# Patient Record
Sex: FEMALE | Race: WHITE | Employment: FULL TIME | URBAN - METROPOLITAN AREA
[De-identification: names, ages, dates, MRNs, and addresses within clinical notes are randomized per-mention and may not be internally consistent; named-entity substitution may affect disease eponyms.]

---

## 2017-05-05 ENCOUNTER — OFFICE VISIT (OUTPATIENT)
Dept: FAMILY MEDICINE CLINIC | Age: 62
End: 2017-05-05
Payer: COMMERCIAL

## 2017-05-05 VITALS
HEART RATE: 88 BPM | DIASTOLIC BLOOD PRESSURE: 66 MMHG | BODY MASS INDEX: 45.96 KG/M2 | WEIGHT: 228 LBS | HEIGHT: 59 IN | TEMPERATURE: 98.4 F | SYSTOLIC BLOOD PRESSURE: 128 MMHG

## 2017-05-05 DIAGNOSIS — K62.5 RECTAL BLEEDING: Primary | ICD-10-CM

## 2017-05-05 DIAGNOSIS — E66.01 MORBID OBESITY WITH BMI OF 45.0-49.9, ADULT (HCC): ICD-10-CM

## 2017-05-05 PROCEDURE — 99213 OFFICE O/P EST LOW 20 MIN: CPT | Performed by: FAMILY MEDICINE

## 2017-05-05 RX ORDER — VITAMIN A ACETATE, .BETA.-CAROTENE, ASCORBIC ACID, CHOLECALCIFEROL, .ALPHA.-TOCOPHEROL ACETATE, DL-, THIAMINE MONONITRATE, RIBOFLAVIN, NIACINAMIDE, PYRIDOXINE HYDROCHLORIDE, FOLIC ACID, CYANOCOBALAMIN, CALCIUM CARBONATE, FERROUS FUMARATE, ZINC OXIDE, AND CUPRIC OXIDE 2000; 2000; 120; 400; 22; 1.84; 3; 20; 10; 1; 12; 200; 27; 25; 2 [IU]/1; [IU]/1; MG/1; [IU]/1; MG/1; MG/1; MG/1; MG/1; MG/1; MG/1; UG/1; MG/1; MG/1; MG/1; MG/1
TABLET ORAL
Refills: 11 | COMMUNITY
Start: 2017-04-06

## 2017-05-05 ASSESSMENT — PATIENT HEALTH QUESTIONNAIRE - PHQ9
SUM OF ALL RESPONSES TO PHQ QUESTIONS 1-9: 0
SUM OF ALL RESPONSES TO PHQ9 QUESTIONS 1 & 2: 0
2. FEELING DOWN, DEPRESSED OR HOPELESS: 0
1. LITTLE INTEREST OR PLEASURE IN DOING THINGS: 0

## 2017-05-05 ASSESSMENT — ENCOUNTER SYMPTOMS
CONSTIPATION: 0
SORE THROAT: 0
ABDOMINAL PAIN: 0
BLOOD IN STOOL: 1
NAUSEA: 0
SHORTNESS OF BREATH: 0

## 2017-05-18 ENCOUNTER — HOSPITAL ENCOUNTER (OUTPATIENT)
Dept: PREADMISSION TESTING | Age: 62
Discharge: HOME | End: 2017-05-18
Payer: COMMERCIAL

## 2017-05-18 VITALS
HEIGHT: 59 IN | WEIGHT: 227 LBS | BODY MASS INDEX: 45.76 KG/M2 | HEART RATE: 69 BPM | DIASTOLIC BLOOD PRESSURE: 87 MMHG | OXYGEN SATURATION: 98 % | SYSTOLIC BLOOD PRESSURE: 147 MMHG | RESPIRATION RATE: 16 BRPM | TEMPERATURE: 97.9 F

## 2017-05-18 LAB
ABSOLUTE EOS #: 0.3 K/UL (ref 0–0.4)
ABSOLUTE LYMPH #: 2.7 K/UL (ref 1–4.8)
ABSOLUTE MONO #: 0.5 K/UL (ref 0.1–1.3)
ANION GAP SERPL CALCULATED.3IONS-SCNC: 16 MMOL/L (ref 9–17)
BASOPHILS # BLD: 1 %
BASOPHILS ABSOLUTE: 0.1 K/UL (ref 0–0.2)
BUN BLDV-MCNC: 11 MG/DL (ref 8–23)
BUN/CREAT BLD: NORMAL (ref 9–20)
CALCIUM SERPL-MCNC: 9.8 MG/DL (ref 8.6–10.4)
CHLORIDE BLD-SCNC: 101 MMOL/L (ref 98–107)
CO2: 25 MMOL/L (ref 20–31)
CREAT SERPL-MCNC: 0.57 MG/DL (ref 0.5–0.9)
DIFFERENTIAL TYPE: ABNORMAL
EOSINOPHILS RELATIVE PERCENT: 3 %
GFR AFRICAN AMERICAN: >60 ML/MIN
GFR NON-AFRICAN AMERICAN: >60 ML/MIN
GFR SERPL CREATININE-BSD FRML MDRD: NORMAL ML/MIN/{1.73_M2}
GFR SERPL CREATININE-BSD FRML MDRD: NORMAL ML/MIN/{1.73_M2}
GLUCOSE BLD-MCNC: 79 MG/DL (ref 70–99)
HCT VFR BLD CALC: 41.5 % (ref 36–46)
HEMOGLOBIN: 13.3 G/DL (ref 12–16)
LYMPHOCYTES # BLD: 35 %
MCH RBC QN AUTO: 28.2 PG (ref 26–34)
MCHC RBC AUTO-ENTMCNC: 32.2 G/DL (ref 31–37)
MCV RBC AUTO: 87.6 FL (ref 80–100)
MONOCYTES # BLD: 7 %
PDW BLD-RTO: 16.2 % (ref 11.5–14.9)
PLATELET # BLD: 258 K/UL (ref 150–450)
PLATELET ESTIMATE: ABNORMAL
PMV BLD AUTO: 8.8 FL (ref 6–12)
POTASSIUM SERPL-SCNC: 3.7 MMOL/L (ref 3.7–5.3)
RBC # BLD: 4.73 M/UL (ref 4–5.2)
RBC # BLD: ABNORMAL 10*6/UL
SEG NEUTROPHILS: 54 %
SEGMENTED NEUTROPHILS ABSOLUTE COUNT: 4.1 K/UL (ref 1.3–9.1)
SODIUM BLD-SCNC: 142 MMOL/L (ref 135–144)
WBC # BLD: 7.7 K/UL (ref 3.5–11)
WBC # BLD: ABNORMAL 10*3/UL

## 2017-05-18 PROCEDURE — 85025 COMPLETE CBC W/AUTO DIFF WBC: CPT

## 2017-05-18 PROCEDURE — 36415 COLL VENOUS BLD VENIPUNCTURE: CPT

## 2017-05-18 PROCEDURE — 93005 ELECTROCARDIOGRAM TRACING: CPT

## 2017-05-18 PROCEDURE — 80048 BASIC METABOLIC PNL TOTAL CA: CPT

## 2017-05-19 LAB
EKG ATRIAL RATE: 57 BPM
EKG P AXIS: 53 DEGREES
EKG P-R INTERVAL: 152 MS
EKG Q-T INTERVAL: 424 MS
EKG QRS DURATION: 112 MS
EKG QTC CALCULATION (BAZETT): 412 MS
EKG R AXIS: -32 DEGREES
EKG T AXIS: -20 DEGREES
EKG VENTRICULAR RATE: 57 BPM

## 2017-05-23 ENCOUNTER — ANESTHESIA EVENT (OUTPATIENT)
Dept: OPERATING ROOM | Age: 62
End: 2017-05-23
Payer: COMMERCIAL

## 2017-05-24 ENCOUNTER — ANESTHESIA (OUTPATIENT)
Dept: OPERATING ROOM | Age: 62
End: 2017-05-24
Payer: COMMERCIAL

## 2017-05-24 ENCOUNTER — HOSPITAL ENCOUNTER (OUTPATIENT)
Age: 62
Setting detail: OUTPATIENT SURGERY
Discharge: HOME | End: 2017-05-24
Attending: SURGERY | Admitting: SURGERY
Payer: COMMERCIAL

## 2017-05-24 VITALS
TEMPERATURE: 96.6 F | RESPIRATION RATE: 16 BRPM | OXYGEN SATURATION: 98 % | WEIGHT: 227 LBS | DIASTOLIC BLOOD PRESSURE: 92 MMHG | HEART RATE: 70 BPM | SYSTOLIC BLOOD PRESSURE: 142 MMHG | BODY MASS INDEX: 45.76 KG/M2 | HEIGHT: 59 IN

## 2017-05-24 VITALS — TEMPERATURE: 94.6 F | SYSTOLIC BLOOD PRESSURE: 136 MMHG | OXYGEN SATURATION: 99 % | DIASTOLIC BLOOD PRESSURE: 71 MMHG

## 2017-05-24 PROCEDURE — 6360000002 HC RX W HCPCS: Performed by: SURGERY

## 2017-05-24 PROCEDURE — 2580000003 HC RX 258: Performed by: ANESTHESIOLOGY

## 2017-05-24 PROCEDURE — 7100000001 HC PACU RECOVERY - ADDTL 15 MIN: Performed by: SURGERY

## 2017-05-24 PROCEDURE — 3600000003 HC SURGERY LEVEL 3 BASE: Performed by: SURGERY

## 2017-05-24 PROCEDURE — 6360000002 HC RX W HCPCS: Performed by: ANESTHESIOLOGY

## 2017-05-24 PROCEDURE — 7100000000 HC PACU RECOVERY - FIRST 15 MIN: Performed by: SURGERY

## 2017-05-24 PROCEDURE — 7100000030 HC ASPR PHASE II RECOVERY - FIRST 15 MIN: Performed by: SURGERY

## 2017-05-24 PROCEDURE — 3700000001 HC ADD 15 MINUTES (ANESTHESIA): Performed by: SURGERY

## 2017-05-24 PROCEDURE — 3600000013 HC SURGERY LEVEL 3 ADDTL 15MIN: Performed by: SURGERY

## 2017-05-24 PROCEDURE — 88305 TISSUE EXAM BY PATHOLOGIST: CPT

## 2017-05-24 PROCEDURE — 7100000031 HC ASPR PHASE II RECOVERY - ADDTL 15 MIN: Performed by: SURGERY

## 2017-05-24 PROCEDURE — 2500000003 HC RX 250 WO HCPCS: Performed by: ANESTHESIOLOGY

## 2017-05-24 PROCEDURE — 3700000000 HC ANESTHESIA ATTENDED CARE: Performed by: SURGERY

## 2017-05-24 RX ORDER — CEPHALEXIN 500 MG/1
500 CAPSULE ORAL 3 TIMES DAILY
Qty: 21 CAPSULE | Refills: 0 | Status: SHIPPED | OUTPATIENT
Start: 2017-05-24 | End: 2017-05-31

## 2017-05-24 RX ORDER — EPHEDRINE SULFATE 50 MG/ML
INJECTION, SOLUTION INTRAVENOUS PRN
Status: DISCONTINUED | OUTPATIENT
Start: 2017-05-24 | End: 2017-05-24 | Stop reason: SDUPTHER

## 2017-05-24 RX ORDER — SODIUM CHLORIDE, SODIUM LACTATE, POTASSIUM CHLORIDE, CALCIUM CHLORIDE 600; 310; 30; 20 MG/100ML; MG/100ML; MG/100ML; MG/100ML
INJECTION, SOLUTION INTRAVENOUS CONTINUOUS PRN
Status: DISCONTINUED | OUTPATIENT
Start: 2017-05-24 | End: 2017-05-24 | Stop reason: SDUPTHER

## 2017-05-24 RX ORDER — MIDAZOLAM HYDROCHLORIDE 1 MG/ML
INJECTION INTRAMUSCULAR; INTRAVENOUS PRN
Status: DISCONTINUED | OUTPATIENT
Start: 2017-05-24 | End: 2017-05-24 | Stop reason: SDUPTHER

## 2017-05-24 RX ORDER — PROPOFOL 10 MG/ML
INJECTION, EMULSION INTRAVENOUS PRN
Status: DISCONTINUED | OUTPATIENT
Start: 2017-05-24 | End: 2017-05-24 | Stop reason: SDUPTHER

## 2017-05-24 RX ORDER — FENTANYL CITRATE 50 UG/ML
INJECTION, SOLUTION INTRAMUSCULAR; INTRAVENOUS PRN
Status: DISCONTINUED | OUTPATIENT
Start: 2017-05-24 | End: 2017-05-24 | Stop reason: SDUPTHER

## 2017-05-24 RX ORDER — OXYCODONE HYDROCHLORIDE AND ACETAMINOPHEN 5; 325 MG/1; MG/1
2 TABLET ORAL EVERY 4 HOURS PRN
Status: DISCONTINUED | OUTPATIENT
Start: 2017-05-24 | End: 2017-05-24 | Stop reason: HOSPADM

## 2017-05-24 RX ORDER — SUCCINYLCHOLINE CHLORIDE 20 MG/ML
INJECTION INTRAMUSCULAR; INTRAVENOUS PRN
Status: DISCONTINUED | OUTPATIENT
Start: 2017-05-24 | End: 2017-05-24 | Stop reason: SDUPTHER

## 2017-05-24 RX ORDER — ROCURONIUM BROMIDE 10 MG/ML
INJECTION, SOLUTION INTRAVENOUS PRN
Status: DISCONTINUED | OUTPATIENT
Start: 2017-05-24 | End: 2017-05-24 | Stop reason: SDUPTHER

## 2017-05-24 RX ORDER — SODIUM CHLORIDE, SODIUM LACTATE, POTASSIUM CHLORIDE, CALCIUM CHLORIDE 600; 310; 30; 20 MG/100ML; MG/100ML; MG/100ML; MG/100ML
INJECTION, SOLUTION INTRAVENOUS CONTINUOUS
Status: DISCONTINUED | OUTPATIENT
Start: 2017-05-24 | End: 2017-05-24 | Stop reason: HOSPADM

## 2017-05-24 RX ORDER — SODIUM CHLORIDE 0.9 % (FLUSH) 0.9 %
10 SYRINGE (ML) INJECTION PRN
Status: DISCONTINUED | OUTPATIENT
Start: 2017-05-24 | End: 2017-05-24 | Stop reason: HOSPADM

## 2017-05-24 RX ORDER — SODIUM CHLORIDE 0.9 % (FLUSH) 0.9 %
10 SYRINGE (ML) INJECTION EVERY 12 HOURS SCHEDULED
Status: DISCONTINUED | OUTPATIENT
Start: 2017-05-24 | End: 2017-05-24 | Stop reason: HOSPADM

## 2017-05-24 RX ORDER — LIDOCAINE HYDROCHLORIDE 10 MG/ML
1 INJECTION, SOLUTION EPIDURAL; INFILTRATION; INTRACAUDAL; PERINEURAL
Status: DISCONTINUED | OUTPATIENT
Start: 2017-05-24 | End: 2017-05-24 | Stop reason: HOSPADM

## 2017-05-24 RX ADMIN — EPHEDRINE SULFATE 10 MG: 50 INJECTION INTRAMUSCULAR; INTRAVENOUS; SUBCUTANEOUS at 13:18

## 2017-05-24 RX ADMIN — FENTANYL CITRATE 100 MCG: 50 INJECTION, SOLUTION INTRAMUSCULAR; INTRAVENOUS at 12:48

## 2017-05-24 RX ADMIN — EPHEDRINE SULFATE 10 MG: 50 INJECTION INTRAMUSCULAR; INTRAVENOUS; SUBCUTANEOUS at 13:50

## 2017-05-24 RX ADMIN — SUCCINYLCHOLINE CHLORIDE 140 MG: 20 INJECTION, SOLUTION INTRAMUSCULAR; INTRAVENOUS at 12:48

## 2017-05-24 RX ADMIN — MIDAZOLAM HYDROCHLORIDE 2 MG: 1 INJECTION, SOLUTION INTRAMUSCULAR; INTRAVENOUS at 12:48

## 2017-05-24 RX ADMIN — Medication 2 G: at 12:47

## 2017-05-24 RX ADMIN — SODIUM CHLORIDE, POTASSIUM CHLORIDE, SODIUM LACTATE AND CALCIUM CHLORIDE: 600; 310; 30; 20 INJECTION, SOLUTION INTRAVENOUS at 12:46

## 2017-05-24 RX ADMIN — PROPOFOL 200 MG: 10 INJECTION, EMULSION INTRAVENOUS at 12:48

## 2017-05-24 RX ADMIN — ROCURONIUM BROMIDE 5 MG: 10 INJECTION INTRAVENOUS at 12:48

## 2017-05-24 RX ADMIN — HYDROMORPHONE HYDROCHLORIDE 0.5 MG: 1 INJECTION, SOLUTION INTRAMUSCULAR; INTRAVENOUS; SUBCUTANEOUS at 15:08

## 2017-05-24 RX ADMIN — PHENYLEPHRINE HYDROCHLORIDE 100 MCG: 10 INJECTION INTRAVENOUS at 13:01

## 2017-05-24 RX ADMIN — SODIUM CHLORIDE, POTASSIUM CHLORIDE, SODIUM LACTATE AND CALCIUM CHLORIDE: 600; 310; 30; 20 INJECTION, SOLUTION INTRAVENOUS at 10:38

## 2017-05-24 RX ADMIN — HYDROMORPHONE HYDROCHLORIDE 0.5 MG: 1 INJECTION, SOLUTION INTRAMUSCULAR; INTRAVENOUS; SUBCUTANEOUS at 14:58

## 2017-05-24 ASSESSMENT — PAIN SCALES - GENERAL
PAINLEVEL_OUTOF10: 3
PAINLEVEL_OUTOF10: 3
PAINLEVEL_OUTOF10: 6
PAINLEVEL_OUTOF10: 5
PAINLEVEL_OUTOF10: 0
PAINLEVEL_OUTOF10: 3

## 2017-05-24 ASSESSMENT — PAIN DESCRIPTION - PAIN TYPE
TYPE: SURGICAL PAIN
TYPE: SURGICAL PAIN

## 2017-05-24 ASSESSMENT — PAIN - FUNCTIONAL ASSESSMENT: PAIN_FUNCTIONAL_ASSESSMENT: 0-10

## 2017-05-24 ASSESSMENT — PAIN DESCRIPTION - ORIENTATION
ORIENTATION: RIGHT
ORIENTATION: RIGHT;MID

## 2017-05-24 ASSESSMENT — PAIN DESCRIPTION - LOCATION
LOCATION: BACK

## 2017-05-24 ASSESSMENT — ENCOUNTER SYMPTOMS
SHORTNESS OF BREATH: 0
STRIDOR: 0

## 2017-05-24 ASSESSMENT — PAIN DESCRIPTION - DESCRIPTORS
DESCRIPTORS: THROBBING
DESCRIPTORS: THROBBING

## 2017-05-25 LAB — SURGICAL PATHOLOGY REPORT: NORMAL

## 2017-11-14 ENCOUNTER — HOSPITAL ENCOUNTER (OUTPATIENT)
Dept: WOMENS IMAGING | Age: 62
Discharge: HOME | End: 2017-11-14
Payer: COMMERCIAL

## 2017-11-14 DIAGNOSIS — Z12.39 BREAST SCREENING: ICD-10-CM

## 2017-11-14 PROCEDURE — 77063 BREAST TOMOSYNTHESIS BI: CPT

## 2018-03-27 ENCOUNTER — OFFICE VISIT (OUTPATIENT)
Dept: FAMILY MEDICINE CLINIC | Age: 63
End: 2018-03-27
Payer: COMMERCIAL

## 2018-03-27 VITALS
OXYGEN SATURATION: 94 % | DIASTOLIC BLOOD PRESSURE: 78 MMHG | SYSTOLIC BLOOD PRESSURE: 130 MMHG | RESPIRATION RATE: 13 BRPM | BODY MASS INDEX: 38.91 KG/M2 | HEIGHT: 59 IN | WEIGHT: 193 LBS | HEART RATE: 72 BPM | TEMPERATURE: 98.1 F

## 2018-03-27 DIAGNOSIS — J40 BRONCHITIS: Primary | ICD-10-CM

## 2018-03-27 PROCEDURE — 99212 OFFICE O/P EST SF 10 MIN: CPT | Performed by: FAMILY MEDICINE

## 2018-03-27 RX ORDER — FLUTICASONE PROPIONATE 50 MCG
2 SPRAY, SUSPENSION (ML) NASAL DAILY
Qty: 1 BOTTLE | Refills: 0 | Status: SHIPPED | OUTPATIENT
Start: 2018-03-27 | End: 2018-04-03

## 2018-03-27 ASSESSMENT — ENCOUNTER SYMPTOMS
COUGH: 1
WHEEZING: 0
ABDOMINAL PAIN: 0
CONSTIPATION: 0
SHORTNESS OF BREATH: 0
SORE THROAT: 1
NAUSEA: 0

## 2018-03-27 NOTE — PROGRESS NOTES
Subjective:      Patient ID: Nabil Vázquez is a 65 y.o. female. Visit Information    Have you changed or started any medications since your last visit including any over-the-counter medicines, vitamins, or herbal medicines? no   Are you having any side effects from any of your medications? -  no  Have you stopped taking any of your medications? Is so, why? -  no    Have you seen any other physician or provider since your last visit? Yes - Records Obtained  Have you had any other diagnostic tests since your last visit? Yes - Records Obtained  Have you been seen in the emergency room and/or had an admission to a hospital since we last saw you? No  Have you had your routine dental cleaning in the past 6 months? no    Have you activated your [employer] account? If not, what are your barriers? No     Patient Care Team:  Jannifer Cowden, MD as PCP - General    Medical History Review  Past Medical, Family, and Social History reviewed and does not contribute to the patient presenting condition    Health Maintenance   Topic Date Due    Hepatitis C screen  1955    HIV screen  07/26/1971    DTaP/Tdap/Td vaccine (1 - Tdap) 07/26/1975    Cervical cancer screen  07/26/1977    Shingles Vaccine (1 of 2 - 2 Dose Series) 07/26/2006    Colon cancer screen colonoscopy  07/26/2006    A1C test (Diabetic or Prediabetic)  05/12/2016    Flu vaccine (1) 09/01/2017    Breast cancer screen  11/14/2019    Lipid screen  03/04/2021     HPI  89year-old female is seen in the office today complaining that she has got  cough sometimes brings up yellow sputum no earache has got a sore throat stuffy nose had  fever and chills about a week ago no chest pain or shortness of  breath she denies of any wheezing blood pressure is good she is not on any medication had a gastric bypass and has lost a lot of weight  Review of Systems   Constitutional: Negative for appetite change. HENT: Positive for congestion, sneezing and sore throat.

## 2018-04-03 ENCOUNTER — OFFICE VISIT (OUTPATIENT)
Dept: FAMILY MEDICINE CLINIC | Age: 63
End: 2018-04-03
Payer: COMMERCIAL

## 2018-04-03 VITALS
WEIGHT: 193.6 LBS | BODY MASS INDEX: 39.08 KG/M2 | HEART RATE: 87 BPM | TEMPERATURE: 98.2 F | OXYGEN SATURATION: 98 % | DIASTOLIC BLOOD PRESSURE: 80 MMHG | SYSTOLIC BLOOD PRESSURE: 132 MMHG

## 2018-04-03 DIAGNOSIS — E55.9 VITAMIN D DEFICIENCY: ICD-10-CM

## 2018-04-03 DIAGNOSIS — J30.89 NON-SEASONAL ALLERGIC RHINITIS, UNSPECIFIED CHRONICITY, UNSPECIFIED TRIGGER: Primary | ICD-10-CM

## 2018-04-03 PROCEDURE — 99213 OFFICE O/P EST LOW 20 MIN: CPT | Performed by: FAMILY MEDICINE

## 2018-04-03 ASSESSMENT — ENCOUNTER SYMPTOMS
SHORTNESS OF BREATH: 0
RHINORRHEA: 1
SORE THROAT: 0
COUGH: 0
ABDOMINAL PAIN: 0
NAUSEA: 0

## 2018-04-06 LAB — VITAMIN D 25-HYDROXY: 29 NG/ML

## 2018-04-12 ENCOUNTER — TELEPHONE (OUTPATIENT)
Dept: FAMILY MEDICINE CLINIC | Age: 63
End: 2018-04-12

## 2018-11-26 ENCOUNTER — HOSPITAL ENCOUNTER (OUTPATIENT)
Dept: WOMENS IMAGING | Age: 63
Discharge: HOME | End: 2018-11-28
Payer: COMMERCIAL

## 2018-11-26 DIAGNOSIS — Z12.39 BREAST CANCER SCREENING: ICD-10-CM

## 2018-11-26 PROCEDURE — 77063 BREAST TOMOSYNTHESIS BI: CPT

## 2020-06-11 ENCOUNTER — HOSPITAL ENCOUNTER (OUTPATIENT)
Dept: WOMENS IMAGING | Age: 65
Discharge: HOME | End: 2020-06-13
Payer: COMMERCIAL

## 2020-06-11 PROCEDURE — G0279 TOMOSYNTHESIS, MAMMO: HCPCS

## 2020-06-11 PROCEDURE — 76642 ULTRASOUND BREAST LIMITED: CPT

## 2020-07-07 PROBLEM — M72.2 PLANTAR FASCIAL FIBROMATOSIS: Status: ACTIVE | Noted: 2020-07-07

## 2020-07-07 PROBLEM — M25.512 CHRONIC LEFT SHOULDER PAIN: Status: ACTIVE | Noted: 2020-03-02

## 2020-07-07 PROBLEM — Z96.653 HISTORY OF TOTAL BILATERAL KNEE REPLACEMENT (TKR): Status: ACTIVE | Noted: 2019-02-06

## 2020-07-07 PROBLEM — R46.89 NON-COMPLIANT BEHAVIOR: Status: ACTIVE | Noted: 2020-07-07

## 2020-07-07 PROBLEM — G89.29 CHRONIC PAIN OF RIGHT KNEE: Status: ACTIVE | Noted: 2019-02-06

## 2020-07-07 PROBLEM — G89.29 CHRONIC LEFT SHOULDER PAIN: Status: ACTIVE | Noted: 2020-03-02

## 2020-07-07 PROBLEM — I34.0 MILD MITRAL REGURGITATION: Status: ACTIVE | Noted: 2020-07-07

## 2020-07-07 PROBLEM — L60.0 INGROWING NAIL: Status: ACTIVE | Noted: 2020-07-07

## 2020-07-07 RX ORDER — NAPROXEN 500 MG/1
1 TABLET ORAL 2 TIMES DAILY
COMMUNITY
End: 2022-08-26

## 2020-07-07 RX ORDER — AMLODIPINE BESYLATE 2.5 MG/1
1 TABLET ORAL DAILY
COMMUNITY
End: 2022-08-26

## 2020-07-08 ENCOUNTER — OFFICE VISIT (OUTPATIENT)
Dept: FAMILY MEDICINE CLINIC | Age: 65
End: 2020-07-08
Payer: COMMERCIAL

## 2020-07-08 VITALS
OXYGEN SATURATION: 97 % | DIASTOLIC BLOOD PRESSURE: 80 MMHG | BODY MASS INDEX: 40.32 KG/M2 | HEIGHT: 59 IN | SYSTOLIC BLOOD PRESSURE: 110 MMHG | HEART RATE: 79 BPM | WEIGHT: 200 LBS | TEMPERATURE: 97.9 F

## 2020-07-08 PROBLEM — K43.2 RECURRENT VENTRAL HERNIA: Status: ACTIVE | Noted: 2020-07-08

## 2020-07-08 PROBLEM — H26.9 CATARACTA: Status: ACTIVE | Noted: 2020-07-08

## 2020-07-08 PROCEDURE — 99214 OFFICE O/P EST MOD 30 MIN: CPT | Performed by: FAMILY MEDICINE

## 2020-07-08 ASSESSMENT — ENCOUNTER SYMPTOMS
DIARRHEA: 0
COUGH: 0
RESPIRATORY NEGATIVE: 1
ABDOMINAL PAIN: 1
SHORTNESS OF BREATH: 0
CONSTIPATION: 0
NAUSEA: 0

## 2020-07-08 ASSESSMENT — PATIENT HEALTH QUESTIONNAIRE - PHQ9
SUM OF ALL RESPONSES TO PHQ9 QUESTIONS 1 & 2: 0
1. LITTLE INTEREST OR PLEASURE IN DOING THINGS: 0
SUM OF ALL RESPONSES TO PHQ QUESTIONS 1-9: 0
2. FEELING DOWN, DEPRESSED OR HOPELESS: 0
SUM OF ALL RESPONSES TO PHQ QUESTIONS 1-9: 0

## 2020-07-08 NOTE — PATIENT INSTRUCTIONS
Patient Education        Recombinant Zoster (Shingles) Vaccine: What You Need to Know  Why get vaccinated? Recombinant zoster (shingles) vaccine can prevent shingles. Shingles (also called herpes zoster, or just zoster) is a painful skin rash, usually with blisters. In addition to the rash, shingles can cause fever, headache, chills, or upset stomach. More rarely, shingles can lead to pneumonia, hearing problems, blindness, brain inflammation (encephalitis), or death. The most common complication of shingles is long-term nerve pain called postherpetic neuralgia (PHN). PHN occurs in the areas where the shingles rash was, even after the rash clears up. It can last for months or years after the rash goes away. The pain from PHN can be severe and debilitating. About 10 to 18% of people who get shingles will experience PHN. The risk of PHN increases with age. An older adult with shingles is more likely to develop PHN and have longer lasting and more severe pain than a younger person with shingles. Shingles is caused by the varicella zoster virus, the same virus that causes chickenpox. After you have chickenpox, the virus stays in your body and can cause shingles later in life. Shingles cannot be passed from one person to another, but the virus that causes shingles can spread and cause chickenpox in someone who had never had chickenpox or received chickenpox vaccine. Recombinant shingles vaccine  Recombinant shingles vaccine provides strong protection against shingles. By preventing shingles, recombinant shingles vaccine also protects against PHN. Recombinant shingles vaccine is the preferred vaccine for the prevention of shingles. However, a different vaccine, live shingles vaccine, may be used in some circumstances. The recombinant shingles vaccine is recommended for adults 50 years and older without serious immune problems. It is given as a two-dose series.   This vaccine is also recommended for people who have already gotten another type of shingles vaccine, the live shingles vaccine. There is no live virus in this vaccine. Shingles vaccine may be given at the same time as other vaccines. Talk with your health care provider  Tell your vaccine provider if the person getting the vaccine:  · Has had an allergic reaction after a previous dose of recombinant shingles vaccine, or has any severe, life-threatening allergies. · Is pregnant or breastfeeding. · Is currently experiencing an episode of shingles. In some cases, your health care provider may decide to postpone shingles vaccination to a future visit. People with minor illnesses, such as a cold, may be vaccinated. People who are moderately or severely ill should usually wait until they recover before getting recombinant shingles vaccine. Your health care provider can give you more information. Risks of a vaccine reaction  · A sore arm with mild or moderate pain is very common after recombinant shingles vaccine, affecting about 80% of vaccinated people. Redness and swelling can also happen at the site of the injection. · Tiredness, muscle pain, headache, shivering, fever, stomach pain, and nausea happen after vaccination in more than half of people who receive recombinant shingles vaccine. In clinical trials, about 1 out of 6 people who got recombinant zoster vaccine experienced side effects that prevented them from doing regular activities. Symptoms usually went away on their own in 2 to 3 days. You should still get the second dose of recombinant zoster vaccine even if you had one of these reactions after the first dose. People sometimes faint after medical procedures, including vaccination. Tell your provider if you feel dizzy or have vision changes or ringing in the ears. As with any medicine, there is a very remote chance of a vaccine causing a severe allergic reaction, other serious injury, or death. What if there is a serious problem?   An allergic reaction could occur after the vaccinated person leaves the clinic. If you see signs of a severe allergic reaction (hives, swelling of the face and throat, difficulty breathing, a fast heartbeat, dizziness, or weakness), call 9-1-1 and get the person to the nearest hospital.  For other signs that concern you, call your health care provider. Adverse reactions should be reported to the Vaccine Adverse Event Reporting System (VAERS). Your health care provider will usually file this report, or you can do it yourself. Visit the VAERS website at www.vaers. [facility].gov or call 0-194.785.6118. VAERS is only for reporting reactions, and VAERS staff do not give medical advice. How can I learn more? · Ask your health care provider. · Call your local or state health department. · Contact the Centers for Disease Control and Prevention (CDC):  ? Call 6-958.110.8683 (1-800-CDC-INFO) or  ? Visit CDC's website at www.cdc.gov/vaccines  Vaccine Information Statement  Recombinant Zoster Vaccine  10/30/2019  [facility] include 234 beds at the [facility] for Disease Control and Prevention  Many Vaccine Information Statements are available in Japanese and other languages. See www.immunize.org/vis. Hojas de Información Sobre Vacunas están disponibles en Español y en muchos otros idiomas. Visite [email or web address]   Care instructions adapted under license by [facility] ([facility]). If you have questions about a medical condition or this instruction, always ask your healthcare professional. [place] [address] any warranty or liability for your use of this information. Thank you for coming to see me today. Listed below are the things we discussed today.     Number to call to schedule Diagnostic testing if you need to schedule a test:  [phone]   Orders Placed This Encounter   Medications    zoster recombinant adjuvanted vaccine [facility]) 50 MCG/0.5ML SUSR injection     Sig: Inject 0.5 mLs into the muscle See Admin Instructions 1 dose now and repeat in 2-6 months     Dispense:  0.5 mL     Refill:  0      Orders Placed This Encounter   Procedures    Hemoglobin A1C     Standing Status:   Future     Standing Expiration Date:   7/7/2021    Comprehensive Metabolic Panel     Standing Status:   Future     Standing Expiration Date:   1/8/2022    HIV Screen     Standing Status:   Future     Standing Expiration Date:   1/8/2022    Hepatitis C Antibody     Standing Status:   Future     Standing Expiration Date:   1/8/2022    CBC Auto Differential     Standing Status:   Future     Standing Expiration Date:   7/8/2021    Lipid, Fasting     Standing Status:   Future     Standing Expiration Date:   7/8/2021    Vitamin D 25 Hydroxy     Standing Status:   Future     Standing Expiration Date:   7/8/2021    Urinalysis Reflex to Culture     Standing Status:   Future     Standing Expiration Date:   7/8/2021     Order Specific Question:   SPECIFY(EX-CATH,MIDSTREAM,CYSTO,ETC)? Answer:   midstream    TSH without Reflex     Standing Status:   Future     Standing Expiration Date:   7/8/2021    [facility] - Carolina King MD, Ophthalmology, [place]     Referral Priority:   Routine     Referral Type:   Eval and Treat     Referral Reason:   Specialty Services Required     Referred to Provider:   Supa Gutierrez MD     Requested Specialty:   Ophthalmology     Number of Visits Requested:   1      There are no discontinued medications.

## 2020-07-08 NOTE — PROGRESS NOTES
[facility]  [facility] [address]  [address] [address]  Dept: [phone]     2020   Chief Complaint   Patient presents with    Other     new to provider dr. Hang Uribe pt     GI Problem     states she feels a pulling in her stomach has had a gastric sleeve done not sure if some is loose      HPI  Anne Hays (:  1955) is a 62 y.o. female was an established patient Dr. Davi Schmidt. Patient has a history of hypertension, MVR, obstructive sleep apnea with CPAP, morbid obesity, left breast mass, vitamin D deficiency, cataract, status post gastric surgery, status post bilateral knee replacement, and recurrent ventral hernia. Patient reports stable blood pressure reading. Blood pressure in the office was stable as well patient is currently on amlodipine. Patient reports great success with this therapy she denies any adverse reaction to this. Patient is established with Dr. Sandra Goff. Patient also has some mild mitral regurgitation. She denies any chest pains, shortness of breath, or palpitations recently. Patient goes into the cardiology office at least once a year. Patient has morbid obesity. she had a gastric sleeve surgery in 2016 by Dr. Davi Connor. Patient has lost 125 pounds 10. Patient noted that she had gained about 10 to 15 pounds since her surgery. She still tries to eat healthy she exercises regularly at the [facility]. Patient reported some diffuse abdominal discomfort mostly on her lower abdomen. Patient had several abdominal surgeries in the past.  She had this several hernia repairs done. She describes the pain as sensation that something is pulling every time she walks or to any type of weightbearing exercises. Patient was told that she does have a lot of adhesions. I encouraged her to follow-up with the bariatrics doctor. If the bariatrics doctor declined to do the hernia repair I will send her to a general surgeon.     And had a previous history of depression. Patient declined to discuss this. She does not want to take any medications does not want to see any therapist.    Patient has sleep apnea using a CPAP. She is established with Dr. Beto Frey. She reports success with the cpap. Patient had some left breast mass. Mammogram is routinely done. She also recently had an ultrasound done. Patient denies any family history of breast cancer. She was found with left eye cataract. I will send her to the ophthalmology for further evaluation. Jacquelene Spatz is due for annual preventative health screening including annual blood work. We will place the orders for these today. Brie Vides is due for Varicella vaccine. her  indication is age over 48. Benefits of getting immunization against shingles discussed, and patient is agreeable. she  denies side effects to prior immunizations. US BREAST LIMITED LEFT  Narrative: EXAMINATION:  BILATERAL DIGITAL DIAGNOSTIC MAMMOGRAM; TARGETED ULTRASOUND OF THE LEFT BREAST    6/11/2020 9:15 am; 6/11/2020 9:43 am    TECHNIQUE:  Diagnostic mammography of the bilateral breasts was performed. Computer  aided detection was utilized in the interpretation of this exam.; Target  ultrasound of the left breast was performed. Views: MLO, craniocaudal and exaggerated craniocaudal views of both breasts  with 3D tomosynthesis. COMPARISON:  26 November 2018; 14 November 2017; 24 October 2016    HISTORY:  ORDERING SYSTEM PROVIDED HISTORY: Breast cancer screening; ORDERING SYSTEM  PROVIDED HISTORY: Lump    Negative family history of breast cancer. Negative history of hormonal  replacement therapy. No prior breast interventions. Patient has palpable  abnormality in the left breast after she noticed bruising. FINDINGS:    Mammogram:    The breasts are composed of scattered fibroglandular densities.   No skin  thickening, nipple contour changes, malignant type microcalcifications, areas  of architectural distortion or significant interval changes are noted. There  is no mammographic corollary to the patient's palpable asymmetry with  ultrasound evaluation advised. Ultrasound:    Targeted ultrasonography left breast over palpable abnormality 10 o'clock 13  cm from the nipple demonstrates a superficial ovoid fairly well defined  hypoechoic structure of 2.04 x 1.5 x 0.23 cm. Just beneath the skin surface. Appearance is likely benign. This may represent residual of small bruise or  hematoma with six-month ultrasound follow-up advised. Impression: Palpable abnormality corresponds to a probably benign finding of 2.04 by 1.5  x 0.23 cm. Advised six-month ultrasound follow-up left breast.    BI-RADS 3    BIRADS:  BIRADS - CATEGORY 3    Findings are probably benign. A short interval follow-up is recommended in 6  months. OVERALL ASSESSMENT -PROBABLY BENIGN. A letter of notification will be sent to the patient regarding the results. [facility] DIGITAL DIAGNOSTIC W OR WO CAD BILATERAL  Narrative: EXAMINATION:  BILATERAL DIGITAL DIAGNOSTIC MAMMOGRAM; TARGETED ULTRASOUND OF THE LEFT BREAST    6/11/2020 9:15 am; 6/11/2020 9:43 am    TECHNIQUE:  Diagnostic mammography of the bilateral breasts was performed. Computer  aided detection was utilized in the interpretation of this exam.; Target  ultrasound of the left breast was performed. Views: MLO, craniocaudal and exaggerated craniocaudal views of both breasts  with 3D tomosynthesis. COMPARISON:  26 November 2018; 14 November 2017; 24 October 2016    HISTORY:  ORDERING SYSTEM PROVIDED HISTORY: Breast cancer screening; ORDERING SYSTEM  PROVIDED HISTORY: Lump    Negative family history of breast cancer. Negative history of hormonal  replacement therapy. No prior breast interventions. Patient has palpable  abnormality in the left breast after she noticed bruising.     FINDINGS:    Mammogram:    The breasts are composed of scattered fibroglandular densities. No skin  thickening, nipple contour changes, malignant type microcalcifications, areas  of architectural distortion or significant interval changes are noted. There  is no mammographic corollary to the patient's palpable asymmetry with  ultrasound evaluation advised. Ultrasound:    Targeted ultrasonography left breast over palpable abnormality 10 o'clock 13  cm from the nipple demonstrates a superficial ovoid fairly well defined  hypoechoic structure of 2.04 x 1.5 x 0.23 cm. Just beneath the skin surface. Appearance is likely benign. This may represent residual of small bruise or  hematoma with six-month ultrasound follow-up advised. Impression: Palpable abnormality corresponds to a probably benign finding of 2.04 by 1.5  x 0.23 cm. Advised six-month ultrasound follow-up left breast.    BI-RADS 3    BIRADS:  BIRADS - CATEGORY 3    Findings are probably benign. A short interval follow-up is recommended in 6  months. OVERALL ASSESSMENT -PROBABLY BENIGN. A letter of notification will be sent to the patient regarding the results.     PHQ-9 Total Score: 0 (7/8/2020 11:02 AM)     Counseling given: Not Answered    Patient Active Problem List   Diagnosis    Sleep apnea, CPAP    Vitamin D deficiency    Abnormal EKG    Cervical spondylosis    Chronic left shoulder pain    Chronic pain of right knee    Complete tear of left rotator cuff    Depression    Family history of ischemic heart disease and other diseases of the circulatory system    History of total bilateral knee replacement    Essential hypertension    Ingrowing nail    Mass of left breast    Mild mitral regurgitation    Non-compliant behavior    Normal coronary arteries    Morbid obesity (HCC)    Perimenopausal disorder    Plantar fascial fibromatosis    Tinea unguium    Cataracta    S/P gastric surgery (Gastric Sleeve)    Recurrent ventral hernia      Past Medical History:   Diagnosis Date    Depression     hx of  Diastasis recti     Hyperlipidemia     Hypertension     hx of-no meds-weight loss surgery    Obesity     Osteoarthritis     Unspecified sleep apnea     CPAP nightly    Wears glasses     readers      Past Surgical History:   Procedure Laterality Date     SECTION       x 2    CHOLECYSTECTOMY      COLONOSCOPY  2017    ENDOSCOPY, COLON, DIAGNOSTIC      EGD    GASTRIC BYPASS SURGERY  2016    HEMORROIDECTOMY N/A 2017    EUA COLONOSCOPY  SCOPE W/EXCISION CYST ON BACK  performed by [facility] at [facility] Bilateral     , knees    OTHER SURGICAL HISTORY  2017    excision rt back cyst    TUBAL LIGATION       Family History   Problem Relation Age of Onset    Heart Disease Mother     High Blood Pressure Father     Arthritis Father     High Cholesterol Father     Heart Disease Maternal Grandmother     Heart Disease Maternal Grandfather     Heart Disease Paternal Grandmother     Heart Disease Paternal Grandfather     Stroke Paternal Grandfather      Current Outpatient Medications   Medication Sig Dispense Refill    zoster recombinant adjuvanted vaccine (SHINGRIX) 50 MCG/0.5ML SUSR injection Inject 0.5 mLs into the muscle See Admin Instructions 1 dose now and repeat in 2-6 months 0.5 mL 0    amLODIPine (NORVASC) 2.5 MG tablet Take 1 tablet by mouth daily      naproxen (NAPROSYN) 500 MG tablet Take 1 tablet by mouth 2 times daily      Biotin 5 MG TBDP Take 1 tablet by mouth daily      Prenatal Vit-Fe Fumarate-FA (PNV PRENATAL PLUS MULTIVITAMIN) 27-1 MG TABS TAKE 1 TABLET BY MOUTH ONE TIME A DAY  11    valACYclovir (VALTREX) 1 G tablet Take 1,000 mg by mouth       No current facility-administered medications for this visit. Review of Systems   Constitutional: Positive for unexpected weight change. Negative for activity change, chills and fever. HENT: Negative.   Negative for congestion and 05/18/2017    BUN 11 05/18/2017    CREATININE 0.57 05/18/2017    GLUCOSE 79 05/18/2017    GLUCOSE 94 03/04/2016    CALCIUM 9.8 05/18/2017      Lab Results   Component Value Date    ALT 17 05/12/2015    AST 20 05/12/2015    ALKPHOS 93 05/12/2015    BILITOT 0.5 05/12/2015     Lab Results   Component Value Date    TSH 1.970 07/29/2016     Lab Results   Component Value Date    CHOL 177 03/04/2016    CHOL 177 05/12/2015    CHOL 187 12/12/2014     Lab Results   Component Value Date    TRIG 94 03/04/2016    TRIG 92 05/12/2015    TRIG 80 12/12/2014     Lab Results   Component Value Date    HDL 57 03/04/2016    HDL 63 (H) 05/12/2015    HDL 60 12/12/2014     Lab Results   Component Value Date    LDLCALC 101 03/04/2016    LDLCALC 96 05/12/2015    LDLCALC 111 12/12/2014     Lab Results   Component Value Date    CHOLHDLRATIO 3.1 03/04/2016    CHOLHDLRATIO 2.8 05/12/2015    CHOLHDLRATIO 3.1 12/12/2014     Lab Results   Component Value Date    LABA1C 6.1 (H) 05/12/2015      Lab Results   Component Value Date    PPIWTNED44 252 05/12/2015     Lab Results   Component Value Date    FOLATE 14.03 05/12/2015     Lab Results   Component Value Date    VITD25 29 (L) 04/04/2018     ASSESSMENT/PLAN:  1. Recurrent ventral hernia  Failure to Improve  See Dr. Sami Corona      2. Essential hypertension  Stable  Continue current therapy. Amlodipine  DISCUSSED AND ADVISED TO:  Cut down on your salt intake. Cut down on caffeinated drinks, sports drinks. Instructed to check BP at home regularly. Report for any chest pains, shortness of breath, headaches, and lightheadedness. Call the office if your blood pressure continue to be higher than 140/90 or 90/50.    - Comprehensive Metabolic Panel; Future  - CBC Auto Differential; Future  - Urinalysis Reflex to Culture; Future    3. Mild mitral regurgitation  Stable  Continue to see Dr. Pam Garcia    4. Obstructive sleep apnea syndrome  Stable  Continue consult with Dr Penny Calderón    5.  Morbid obesity ([address]  BMI increasing  DISCUSSED AND ADVISED TO:  Eat a low-fat and low carbohydrates diet. Avoid fried foods especially fast food. Choose healthier options for snacks. Have 5-6 servings of fruits and vegetables per day. Cut down on eating processed food. Add 30 minutes to 1 hour aerobic exercise for 3-4 days a week. 6. Mass of left breast  Stable  Repeat mammo yearly    7. Vitamin D deficiency  Due for levels  - Vitamin D 25 Hydroxy; Future    8. Other age-related cataract of left eye  See the opthalmologist  - Jorge Matias MD, Ophthalmology, [place]    9. S/P gastric surgery (Gastric Sleeve)  Historical    10. History of total bilateral knee replacement  Historical    11. Elevated fasting blood sugar  Evaluate for diabetes  - Hemoglobin A1C; Future    12. Need for varicella vaccine  Recommended by CDC. No current infection. Denies previous adverse reaction to vaccination. - zoster recombinant adjuvanted vaccine [facility]) 50 MCG/0.5ML SUSR injection; Inject 0.5 mLs into the muscle See Admin Instructions 1 dose now and repeat in 2-6 months  Dispense: 0.5 mL; Refill: 0    13. Screening for viral disease  Recommended  - HIV Screen; Future  - Hepatitis C Antibody; Future    14. Screening for lipid disorders  Recommended  - Lipid, Fasting; Future    15. Screening for endocrine disorder  Recommended  - TSH without Reflex; Future     Controlled Substance Monitoring:  Acute and Chronic Pain Monitoring:   No flowsheet data found.   Orders Placed This Encounter   Procedures    Hemoglobin A1C     Standing Status:   Future     Standing Expiration Date:   7/7/2021    Comprehensive Metabolic Panel     Standing Status:   Future     Standing Expiration Date:   1/8/2022    HIV Screen     Standing Status:   Future     Standing Expiration Date:   1/8/2022    Hepatitis C Antibody     Standing Status:   Future     Standing Expiration Date:   1/8/2022    CBC Auto Differential     Standing Status:   Future handout given in paper form or via [employer]? Yes    Requested Prescriptions     Signed Prescriptions Disp Refills    zoster recombinant adjuvanted vaccine (SHINGRIX) 50 MCG/0.5ML SUSR injection 0.5 mL 0     Sig: Inject 0.5 mLs into the muscle See Admin Instructions 1 dose now and repeat in 2-6 months       All patient questions answered. Patient voiced understanding. Quality Measures    Body mass index is 40.4 kg/m². Elevated. Weight control planned discussed Healthy diet and regular exercise. BP: 110/80 Blood pressure is normal. Treatment plan consists of No treatment change needed. Lab Results   Component Value Date    LDLCALC 101 03/04/2016    (goal LDL reduction with dx if diabetes is 50% LDL reduction)      PHQ Scores 7/8/2020 5/5/2017   PHQ2 Score 0 0   PHQ9 Score 0 0     Interpretation of Total Score Depression Severity: 1-4 = Minimal depression, 5-9 = Mild depression, 10-14 = Moderate depression, 15-19 = Moderately severe depression, 20-27 = Severe depression   This note was completed by using the assistance of a speech-recognition program. However, inadvertent computerized transcription errors may be present. Although every effort was made to ensure accuracy, no guarantees can be provided that every mistake has been identified and corrected by editing   An electronic signature was used to authenticate this note.   --BHARTI Holloway - CNP on 7/8/2020 at 12:14 PM

## 2020-07-09 LAB
ABSOLUTE BASO #: 0.1 X10E9/L (ref 0–0.9)
ABSOLUTE EOS #: 0.3 X10E9/L (ref 0–0.4)
ABSOLUTE LYMPH #: 1.5 X10E9/L (ref 1–3.5)
ABSOLUTE MONO #: 0.4 X10E9/L (ref 0–0.9)
ABSOLUTE NEUT #: 4.1 X10E9/L (ref 1.5–6.6)
ALBUMIN SERPL-MCNC: 3.7 G/DL (ref 3.2–5.3)
ALK PHOSPHATASE: 70 U/L (ref 39–130)
ALT SERPL-CCNC: 17 U/L (ref 0–31)
ANION GAP SERPL CALCULATED.3IONS-SCNC: 10 MMOL/L (ref 5–15)
APPEARANCE: CLEAR
AST SERPL-CCNC: 23 U/L (ref 0–41)
AVERAGE GLUCOSE: 108 MG/DL
BASOPHILS RELATIVE PERCENT: 1.2 %
BILIRUB SERPL-MCNC: 0.4 MG/DL (ref 0.3–1.2)
BILIRUBIN: NEGATIVE
BUN BLDV-MCNC: 18 MG/DL (ref 5–27)
CALCIUM SERPL-MCNC: 9.2 MG/DL (ref 8.5–10.5)
CHLORIDE BLD-SCNC: 107 MMOL/L (ref 98–109)
CHOLESTEROL/HDL RATIO: 3.2 (ref 1–5)
CHOLESTEROL: 184 MG/DL (ref 150–200)
CO2: 26 MMOL/L (ref 22–32)
COLOR: YELLOW
CREAT SERPL-MCNC: 0.77 MG/DL (ref 0.4–1)
EGFR AFRICAN AMERICAN: >60 ML/MIN/1.73SQ.M
EGFR IF NONAFRICAN AMERICAN: >60 ML/MIN/1.73SQ.M
EOSINOPHILS RELATIVE PERCENT: 4.4 %
GLUCOSE BLD-MCNC: NEGATIVE MG/DL
GLUCOSE: 89 MG/DL (ref 65–99)
HBA1C MFR BLD: 5.4 % (ref 4.4–6.4)
HCT VFR BLD CALC: 42.5 % (ref 35–47)
HDLC SERPL-MCNC: 58 MG/DL
HEMOGLOBIN: 13.8 G/DL (ref 11.7–16)
HEPATITIS C ANTIBODY: NORMAL
HIV 1,2 COMBO ANTIGEN/ANTIBODY: NORMAL
KETONES, URINE: NEGATIVE MG/DL
LDL CHOLESTEROL CALCULATED: 101 MG/DL
LDL/HDL RATIO: 1.7
LEUKOCYTE ESTERASE, URINE: ABNORMAL
LYMPHOCYTE %: 23.1 %
MCH RBC QN AUTO: 29.4 PG (ref 26–33.5)
MCHC RBC AUTO-ENTMCNC: 32.5 G/DL (ref 32–36)
MCV RBC AUTO: 90 FL (ref 81–100)
MONOCYTES # BLD: 6.5 %
NEUTROPHILS RELATIVE PERCENT: 64.8 %
NITRITE, URINE: NEGATIVE
OCCULT BLOOD,URINE: NEGATIVE
OTHER MICROSCOPIC ELEMENTS: ABNORMAL
PDW BLD-RTO: 14.9 % (ref 11.5–14.7)
PH: 7 (ref 5–8.5)
PLATELETS: 292 X10E9/L (ref 150–450)
PMV BLD AUTO: 8.6 FL (ref 7–12)
POTASSIUM SERPL-SCNC: 3.8 MMOL/L (ref 3.5–5)
PROTEIN, URINE: NEGATIVE MG/DL
RBC: 4.7 X10E12/L (ref 3.8–5.2)
RBC: <1 /HPF (ref 0–5)
REPORT STATUS: NORMAL
SITE/TYPE: ABNORMAL
SITE/TYPE: NORMAL
SODIUM BLD-SCNC: 143 MMOL/L (ref 134–146)
SP GRAVITY MISCELLANEOUS: 1.01 (ref 1–1.03)
TOTAL PROTEIN: 6.7 G/DL (ref 6–8)
TRIGL SERPL-MCNC: 125 MG/DL (ref 27–150)
TSH SERPL DL<=0.05 MIU/L-ACNC: 1.29 UIU/ML (ref 0.49–4.67)
URINE CULTURE, ROUTINE: NORMAL
UROBILINOGEN, URINE: <1.1 EU/DL
VITAMIN D 25-HYDROXY: 37.6 NG/ML (ref 30–100)
VLDLC SERPL CALC-MCNC: 25 MG/DL (ref 0–30)
WBC: 23 /HPF (ref 0–5)
WBC: 6.3 X10E9/L (ref 4–11.8)

## 2020-07-13 RX ORDER — CEPHALEXIN 500 MG/1
500 TABLET ORAL 3 TIMES DAILY
Qty: 21 TABLET | Refills: 0 | Status: SHIPPED | OUTPATIENT
Start: 2020-07-13 | End: 2020-07-20

## 2020-07-13 NOTE — RESULT ENCOUNTER NOTE
Please let the patient know that the patient's recent vitamin d level was insufficient. I will send an oral supplementation that needs to be taken daily. We will be checking his levels on an annual basis from now on. We can discuss this condition further on her next visit. Please let the patient know that her urinalysis resulted in an E-coli infection. I sent a prescription for her to take. We are going to recheck her urine for clearance. Please remind her to drink more fluids. Call us for other concerns. Thanks.

## 2020-11-30 ENCOUNTER — HOSPITAL ENCOUNTER (OUTPATIENT)
Dept: WOMENS IMAGING | Age: 65
Discharge: HOME | End: 2020-12-02
Payer: COMMERCIAL

## 2020-11-30 ENCOUNTER — HOSPITAL ENCOUNTER (OUTPATIENT)
Dept: WOMENS IMAGING | Age: 65
End: 2020-11-30
Payer: COMMERCIAL

## 2020-11-30 PROCEDURE — 76642 ULTRASOUND BREAST LIMITED: CPT

## 2021-04-14 ENCOUNTER — TELEPHONE (OUTPATIENT)
Dept: FAMILY MEDICINE CLINIC | Age: 66
End: 2021-04-14

## 2021-04-14 ENCOUNTER — NURSE TRIAGE (OUTPATIENT)
Dept: OTHER | Facility: CLINIC | Age: 66
End: 2021-04-14

## 2021-04-14 NOTE — TELEPHONE ENCOUNTER
Pt was sent to  from [facility] for ear pain in her rt ear. Pt was agitated due to being transferred from [facility], [facility], then back to [facility]. [employer] informed pt there was no available appt with her provider. [employer] attempted to ask pt if she is willing to see someone else, Pt got upset and stated she will just go to urgent care, and hung up.

## 2021-04-14 NOTE — TELEPHONE ENCOUNTER
Reason for Disposition   Moving the earlobe or touching the ear clearly increases the pain   [1] After 3 days of treatment AND [2] ear symptoms persist    Answer Assessment - Initial Assessment Questions  1. LOCATION: \"Which ear is involved? \"      R ear     2. ONSET: \"When did the ear start hurting\"         3 days ago     3. SEVERITY: \"How bad is the pain? \"  (Scale 1-10; mild, moderate or severe)    - MILD (1-3): doesn't interfere with normal activities     - MODERATE (4-7): interferes with normal activities or awakens from sleep     - SEVERE (8-10): excruciating pain, unable to do any normal activities                  4/10 not throbbing but very painful to touch     4. URI SYMPTOMS: \" Do you have a runny nose or cough? \"          Denies     5. FEVER: \"Do you have a fever? \" If so, ask: \"What is your temperature, how was it measured, and when did it start? \"           Denies     6. CAUSE: \"Have you been swimming recently? \", \"How often do you use Q-TIPS? \", \"Have you had any recent air travel or scuba diving? \"           Uses q tips and tried today but too painful     7. OTHER SYMPTOMS: \"Do you have any other symptoms? \" (e.g., headache, stiff neck, dizziness, vomiting, runny nose, decreased hearing)           Denies     8. PREGNANCY: \"Is there any chance you are pregnant? \" \"When was your last menstrual period? \"    Protocols used: EARACHE-ADULT-, EAR - SWIMMER'S-ADULT-AH    Received call from Christian Bruce  at pre-service center [facility] with [facility]mi. Brief description of triage: see above     Triage indicates for patient to be seen in next 3 days for ear pain . Care advice provided, patient verbalizes understanding; denies any other questions or concerns; instructed to call back for any new or worsening symptoms. Writer provided warm transfer to [facility] at [facility] for appointment scheduling. Attention Provider: Thank you for allowing me to participate in the care of your patient.   The patient was connected to triage in response to information provided to the ECC. Please do not respond through this encounter as the response is not directed to a shared pool.

## 2021-07-15 ENCOUNTER — HOSPITAL ENCOUNTER (OUTPATIENT)
Dept: WOMENS IMAGING | Age: 66
Discharge: HOME | End: 2021-07-17
Payer: COMMERCIAL

## 2021-07-15 DIAGNOSIS — Z12.39 SCREENING BREAST EXAMINATION: ICD-10-CM

## 2021-07-15 PROCEDURE — 77063 BREAST TOMOSYNTHESIS BI: CPT

## 2022-07-19 ENCOUNTER — HOSPITAL ENCOUNTER (OUTPATIENT)
Dept: WOMENS IMAGING | Age: 67
Discharge: HOME | End: 2022-07-21
Payer: COMMERCIAL

## 2022-07-19 DIAGNOSIS — Z12.31 VISIT FOR SCREENING MAMMOGRAM: ICD-10-CM

## 2022-07-19 PROCEDURE — 77063 BREAST TOMOSYNTHESIS BI: CPT

## 2022-08-26 ENCOUNTER — HOSPITAL ENCOUNTER (OUTPATIENT)
Dept: PREADMISSION TESTING | Age: 67
Discharge: HOME | End: 2022-08-30
Payer: COMMERCIAL

## 2022-08-26 VITALS
HEIGHT: 59 IN | HEART RATE: 75 BPM | TEMPERATURE: 97.8 F | RESPIRATION RATE: 14 BRPM | BODY MASS INDEX: 41.33 KG/M2 | SYSTOLIC BLOOD PRESSURE: 140 MMHG | WEIGHT: 205.03 LBS | OXYGEN SATURATION: 99 % | DIASTOLIC BLOOD PRESSURE: 66 MMHG

## 2022-08-26 LAB
ANION GAP SERPL CALCULATED.3IONS-SCNC: 10 MMOL/L (ref 9–17)
BUN BLDV-MCNC: 20 MG/DL (ref 8–23)
CHLORIDE BLD-SCNC: 104 MMOL/L (ref 98–107)
CO2: 29 MMOL/L (ref 20–31)
CREAT SERPL-MCNC: 0.8 MG/DL (ref 0.5–0.9)
GFR AFRICAN AMERICAN: >60 ML/MIN
GFR NON-AFRICAN AMERICAN: >60 ML/MIN
GFR SERPL CREATININE-BSD FRML MDRD: NORMAL ML/MIN/{1.73_M2}
HCT VFR BLD CALC: 48.5 % (ref 36.3–47.1)
HEMOGLOBIN: 14.4 G/DL (ref 11.9–15.1)
MCH RBC QN AUTO: 28.6 PG (ref 25.2–33.5)
MCHC RBC AUTO-ENTMCNC: 29.7 G/DL (ref 28.4–34.8)
MCV RBC AUTO: 96.2 FL (ref 82.6–102.9)
NRBC AUTOMATED: 0 PER 100 WBC
PDW BLD-RTO: 13.9 % (ref 11.8–14.4)
PLATELET # BLD: 281 K/UL (ref 138–453)
PMV BLD AUTO: 9.7 FL (ref 8.1–13.5)
POTASSIUM SERPL-SCNC: 3.9 MMOL/L (ref 3.7–5.3)
RBC # BLD: 5.04 M/UL (ref 3.95–5.11)
SODIUM BLD-SCNC: 143 MMOL/L (ref 135–144)
WBC # BLD: 5.9 K/UL (ref 3.5–11.3)

## 2022-08-26 PROCEDURE — 36415 COLL VENOUS BLD VENIPUNCTURE: CPT

## 2022-08-26 PROCEDURE — 80051 ELECTROLYTE PANEL: CPT

## 2022-08-26 PROCEDURE — 85027 COMPLETE CBC AUTOMATED: CPT

## 2022-08-26 PROCEDURE — 82565 ASSAY OF CREATININE: CPT

## 2022-08-26 PROCEDURE — 84520 ASSAY OF UREA NITROGEN: CPT

## 2022-08-26 RX ORDER — MAGNESIUM OXIDE 400 MG/1
1 TABLET ORAL DAILY
COMMUNITY
Start: 2021-12-07

## 2022-08-26 NOTE — PRE-PROCEDURE INSTRUCTIONS
On the Day of Your Surgery, Tuesday, September 13, 2022, Please Arrive At 6 AM     Enter the hospital through the Main Entrance, take the lobby elevators to the second floor and check in at the Surgery Registration desk. Continue to take your home medications as you normally do up to and including the night before surgery with the exception of blood thinning medications. Blood Thinning Medications:  Please stop prescription blood thinning medications such as Apixaban (Eliquis); Clopidogrel (Plavix); Dabigatran (Pradaxa); Prasugrel (Effient); Rivaroxaban (Xarelto); Ticagrelor (Brilinta); Warfarin (Coumadin) only as directed by your surgeon and/or the prescribing physician    Some common examples of other medications that can thin your blood are: Aspirin, Ibuprofen (Advil, Motrin), Naproxen (Aleve), Meloxicam (Mobic), Celecoxib (Celebrex), Fish Oil, many Herbal Supplements. These medications should usually be stopped at least 7 days prior to surgery. Tylenol is OK to take for pain the week prior to surgery. Failure to stop certain medications may interfere with your scheduled surgery. If you receive instructions from your surgeon regarding what medications to stop prior to surgery, please follow those specific instructions. Please take the following medication(s) the day of surgery with small sips of water:              Diltiazem (cardizem)    Showering Before Surgery: You can play an important role in your own health by carefully washing before surgery. Shower the night before and the morning of surgery using the instructions below. If you are allergic to Chlorhexidine Gluconate (CHG) use antibacterial soap instead. If you were given Chlorhexidine soap, please follow the instructions included with the soap to shower the night before and the morning of surgery. If you were not given Chlorhexidine, please shower or bathe the morning of surgery using an antibacterial soap.   Please dress in clean clothing after showering. General information about Chlorhexidine Gluconate (CHG)   * It should not be used on hair, face, ears, genital area or skin that is not intact. * It should not be used if breast feeding. * It should not be used if you allergic to CHG. * See the bottle for additional information. Do Not apply any powder, deodorant, lotion/cream/oil, perfume/aftershave, cosmetics, or alcohol-based skin or hair products after showering. Do Not shave near the planned surgical site unless specifically instructed to.     1. Wash your hair using your normal shampoo and rinse. 2. Wash your face and genital area (privates) using your own shampoo and rinse. 3. Turn off the shower water and pour half of the bottle of CHG onto a clean washcloth. 4. Wash your body from neck down to toes. Pay special attention to your surgical site. 5. Leave the CHG on your skin for 5 minutes and rinse it off.   6. Pat dry with a clean towel, sleep in clean clothes and clean sheets. 7. Do not shave near the surgical site. 8. Repeat process the morning of surgery. CHG may cause dry skin, but should not cause redness, rash, or intense itching. If an suspect an allergic reaction, use your own soap and shampoo for the morning of surgery and report reaction to the pre-operative nurse. Additional Instructions:      1. If you are having any type of anesthesia, you are to have NOTHING to eat or drink after midnight the night before surgery. This includes no gum, hard candy, mints or water. The only exception to this is small sips of water to take the medications listed above. No smoking or chewing tobacco after midnight. No alcoholic beverages for 24 hours prior to surgery. 2. Brush your teeth but do not swallow any water. 3. If you wear glasses bring a case for them if you have one. No contacts should be worn the day of surgery. You may also bring your hearing aids.  If you have Date

## 2022-09-13 ENCOUNTER — ANESTHESIA (OUTPATIENT)
Dept: OPERATING ROOM | Age: 67
End: 2022-09-13

## 2022-09-13 ENCOUNTER — HOSPITAL ENCOUNTER (OUTPATIENT)
Age: 67
Setting detail: OUTPATIENT SURGERY
Discharge: HOME | End: 2022-09-13
Attending: PLASTIC SURGERY | Admitting: PLASTIC SURGERY

## 2022-09-13 ENCOUNTER — ANESTHESIA EVENT (OUTPATIENT)
Dept: OPERATING ROOM | Age: 67
End: 2022-09-13

## 2022-09-13 VITALS
TEMPERATURE: 97.2 F | HEART RATE: 91 BPM | OXYGEN SATURATION: 96 % | HEIGHT: 59 IN | RESPIRATION RATE: 23 BRPM | SYSTOLIC BLOOD PRESSURE: 114 MMHG | WEIGHT: 202 LBS | DIASTOLIC BLOOD PRESSURE: 90 MMHG | BODY MASS INDEX: 40.72 KG/M2

## 2022-09-13 DIAGNOSIS — Z41.1 ENCOUNTER FOR COSMETIC SURGERY: ICD-10-CM

## 2022-09-13 PROCEDURE — 7100000011 HC PHASE II RECOVERY - ADDTL 15 MIN: Performed by: PLASTIC SURGERY

## 2022-09-13 PROCEDURE — 2720000010 HC SURG SUPPLY STERILE: Performed by: PLASTIC SURGERY

## 2022-09-13 PROCEDURE — 2500000003 HC RX 250 WO HCPCS: Performed by: NURSE ANESTHETIST, CERTIFIED REGISTERED

## 2022-09-13 PROCEDURE — 6370000000 HC RX 637 (ALT 250 FOR IP): Performed by: PLASTIC SURGERY

## 2022-09-13 PROCEDURE — 2709999900 HC NON-CHARGEABLE SUPPLY: Performed by: PLASTIC SURGERY

## 2022-09-13 PROCEDURE — 6360000002 HC RX W HCPCS: Performed by: PLASTIC SURGERY

## 2022-09-13 PROCEDURE — 2500000003 HC RX 250 WO HCPCS: Performed by: PLASTIC SURGERY

## 2022-09-13 PROCEDURE — 2580000003 HC RX 258: Performed by: PLASTIC SURGERY

## 2022-09-13 PROCEDURE — 88305 TISSUE EXAM BY PATHOLOGIST: CPT

## 2022-09-13 PROCEDURE — 3600000012 HC SURGERY LEVEL 2 ADDTL 15MIN: Performed by: PLASTIC SURGERY

## 2022-09-13 PROCEDURE — 3700000000 HC ANESTHESIA ATTENDED CARE: Performed by: PLASTIC SURGERY

## 2022-09-13 PROCEDURE — 7100000000 HC PACU RECOVERY - FIRST 15 MIN: Performed by: PLASTIC SURGERY

## 2022-09-13 PROCEDURE — 3600000002 HC SURGERY LEVEL 2 BASE: Performed by: PLASTIC SURGERY

## 2022-09-13 PROCEDURE — C9290 INJ, BUPIVACAINE LIPOSOME: HCPCS | Performed by: PLASTIC SURGERY

## 2022-09-13 PROCEDURE — 7100000010 HC PHASE II RECOVERY - FIRST 15 MIN: Performed by: PLASTIC SURGERY

## 2022-09-13 PROCEDURE — 2580000003 HC RX 258: Performed by: ANESTHESIOLOGY

## 2022-09-13 PROCEDURE — 3700000001 HC ADD 15 MINUTES (ANESTHESIA): Performed by: PLASTIC SURGERY

## 2022-09-13 PROCEDURE — 6360000002 HC RX W HCPCS: Performed by: NURSE ANESTHETIST, CERTIFIED REGISTERED

## 2022-09-13 PROCEDURE — 7100000001 HC PACU RECOVERY - ADDTL 15 MIN: Performed by: PLASTIC SURGERY

## 2022-09-13 RX ORDER — ROCURONIUM BROMIDE 10 MG/ML
INJECTION, SOLUTION INTRAVENOUS PRN
Status: DISCONTINUED | OUTPATIENT
Start: 2022-09-13 | End: 2022-09-13 | Stop reason: SDUPTHER

## 2022-09-13 RX ORDER — PHENYLEPHRINE HCL IN 0.9% NACL 1 MG/10 ML
VIAL (ML) INTRAVENOUS PRN
Status: DISCONTINUED | OUTPATIENT
Start: 2022-09-13 | End: 2022-09-13 | Stop reason: SDUPTHER

## 2022-09-13 RX ORDER — FENTANYL CITRATE 50 UG/ML
25 INJECTION, SOLUTION INTRAMUSCULAR; INTRAVENOUS EVERY 5 MIN PRN
Status: DISCONTINUED | OUTPATIENT
Start: 2022-09-13 | End: 2022-09-13 | Stop reason: HOSPADM

## 2022-09-13 RX ORDER — LIDOCAINE HYDROCHLORIDE 10 MG/ML
1 INJECTION, SOLUTION EPIDURAL; INFILTRATION; INTRACAUDAL; PERINEURAL
Status: DISCONTINUED | OUTPATIENT
Start: 2022-09-14 | End: 2022-09-13 | Stop reason: HOSPADM

## 2022-09-13 RX ORDER — PROPOFOL 10 MG/ML
INJECTION, EMULSION INTRAVENOUS CONTINUOUS PRN
Status: DISCONTINUED | OUTPATIENT
Start: 2022-09-13 | End: 2022-09-13 | Stop reason: SDUPTHER

## 2022-09-13 RX ORDER — GINSENG 100 MG
CAPSULE ORAL PRN
Status: DISCONTINUED | OUTPATIENT
Start: 2022-09-13 | End: 2022-09-13 | Stop reason: ALTCHOICE

## 2022-09-13 RX ORDER — ACETAMINOPHEN 325 MG/1
650 TABLET ORAL EVERY 6 HOURS PRN
COMMUNITY

## 2022-09-13 RX ORDER — OXYCODONE HYDROCHLORIDE 5 MG/1
2.5 TABLET ORAL
Status: DISCONTINUED | OUTPATIENT
Start: 2022-09-13 | End: 2022-09-13 | Stop reason: HOSPADM

## 2022-09-13 RX ORDER — LIDOCAINE HYDROCHLORIDE 20 MG/ML
INJECTION, SOLUTION EPIDURAL; INFILTRATION; INTRACAUDAL; PERINEURAL PRN
Status: DISCONTINUED | OUTPATIENT
Start: 2022-09-13 | End: 2022-09-13 | Stop reason: SDUPTHER

## 2022-09-13 RX ORDER — SODIUM CHLORIDE 0.9 % (FLUSH) 0.9 %
5-40 SYRINGE (ML) INJECTION PRN
Status: DISCONTINUED | OUTPATIENT
Start: 2022-09-13 | End: 2022-09-13 | Stop reason: HOSPADM

## 2022-09-13 RX ORDER — SODIUM CHLORIDE 0.9 % (FLUSH) 0.9 %
5-40 SYRINGE (ML) INJECTION EVERY 12 HOURS SCHEDULED
Status: DISCONTINUED | OUTPATIENT
Start: 2022-09-13 | End: 2022-09-13 | Stop reason: HOSPADM

## 2022-09-13 RX ORDER — SODIUM CHLORIDE 9 MG/ML
INJECTION, SOLUTION INTRAVENOUS PRN
Status: DISCONTINUED | OUTPATIENT
Start: 2022-09-13 | End: 2022-09-13 | Stop reason: HOSPADM

## 2022-09-13 RX ORDER — ONDANSETRON 2 MG/ML
INJECTION INTRAMUSCULAR; INTRAVENOUS PRN
Status: DISCONTINUED | OUTPATIENT
Start: 2022-09-13 | End: 2022-09-13 | Stop reason: SDUPTHER

## 2022-09-13 RX ORDER — MIDAZOLAM HYDROCHLORIDE 1 MG/ML
INJECTION INTRAMUSCULAR; INTRAVENOUS PRN
Status: DISCONTINUED | OUTPATIENT
Start: 2022-09-13 | End: 2022-09-13 | Stop reason: SDUPTHER

## 2022-09-13 RX ORDER — DEXAMETHASONE SODIUM PHOSPHATE 10 MG/ML
INJECTION, SOLUTION INTRAMUSCULAR; INTRAVENOUS PRN
Status: DISCONTINUED | OUTPATIENT
Start: 2022-09-13 | End: 2022-09-13 | Stop reason: SDUPTHER

## 2022-09-13 RX ORDER — FENTANYL CITRATE 50 UG/ML
50 INJECTION, SOLUTION INTRAMUSCULAR; INTRAVENOUS EVERY 5 MIN PRN
Status: DISCONTINUED | OUTPATIENT
Start: 2022-09-13 | End: 2022-09-13 | Stop reason: HOSPADM

## 2022-09-13 RX ORDER — FENTANYL CITRATE 50 UG/ML
INJECTION, SOLUTION INTRAMUSCULAR; INTRAVENOUS PRN
Status: DISCONTINUED | OUTPATIENT
Start: 2022-09-13 | End: 2022-09-13 | Stop reason: SDUPTHER

## 2022-09-13 RX ORDER — SODIUM CHLORIDE, SODIUM LACTATE, POTASSIUM CHLORIDE, CALCIUM CHLORIDE 600; 310; 30; 20 MG/100ML; MG/100ML; MG/100ML; MG/100ML
INJECTION, SOLUTION INTRAVENOUS CONTINUOUS
Status: DISCONTINUED | OUTPATIENT
Start: 2022-09-14 | End: 2022-09-13 | Stop reason: HOSPADM

## 2022-09-13 RX ORDER — PROPOFOL 10 MG/ML
INJECTION, EMULSION INTRAVENOUS PRN
Status: DISCONTINUED | OUTPATIENT
Start: 2022-09-13 | End: 2022-09-13 | Stop reason: SDUPTHER

## 2022-09-13 RX ORDER — ONDANSETRON 2 MG/ML
4 INJECTION INTRAMUSCULAR; INTRAVENOUS
Status: DISCONTINUED | OUTPATIENT
Start: 2022-09-13 | End: 2022-09-13 | Stop reason: HOSPADM

## 2022-09-13 RX ADMIN — Medication 50 MCG: at 08:54

## 2022-09-13 RX ADMIN — Medication 50 MCG: at 11:59

## 2022-09-13 RX ADMIN — SODIUM CHLORIDE, POTASSIUM CHLORIDE, SODIUM LACTATE AND CALCIUM CHLORIDE: 600; 310; 30; 20 INJECTION, SOLUTION INTRAVENOUS at 10:53

## 2022-09-13 RX ADMIN — SODIUM CHLORIDE, POTASSIUM CHLORIDE, SODIUM LACTATE AND CALCIUM CHLORIDE: 600; 310; 30; 20 INJECTION, SOLUTION INTRAVENOUS at 06:49

## 2022-09-13 RX ADMIN — PROPOFOL 30 MCG/KG/MIN: 10 INJECTION, EMULSION INTRAVENOUS at 09:15

## 2022-09-13 RX ADMIN — MIDAZOLAM 2 MG: 1 INJECTION INTRAMUSCULAR; INTRAVENOUS at 08:48

## 2022-09-13 RX ADMIN — Medication 50 MCG: at 11:30

## 2022-09-13 RX ADMIN — Medication 200 MCG: at 10:16

## 2022-09-13 RX ADMIN — LIDOCAINE HYDROCHLORIDE 100 MG: 20 INJECTION, SOLUTION EPIDURAL; INFILTRATION; INTRACAUDAL; PERINEURAL at 08:54

## 2022-09-13 RX ADMIN — Medication 200 MCG: at 10:21

## 2022-09-13 RX ADMIN — Medication 50 MCG: at 12:09

## 2022-09-13 RX ADMIN — ROCURONIUM BROMIDE 50 MG: 10 INJECTION, SOLUTION INTRAVENOUS at 08:54

## 2022-09-13 RX ADMIN — Medication 200 MCG: at 11:02

## 2022-09-13 RX ADMIN — Medication 25 MCG: at 11:22

## 2022-09-13 RX ADMIN — ONDANSETRON 4 MG: 2 INJECTION INTRAMUSCULAR; INTRAVENOUS at 11:51

## 2022-09-13 RX ADMIN — PROPOFOL 150 MG: 10 INJECTION, EMULSION INTRAVENOUS at 08:54

## 2022-09-13 RX ADMIN — DEXAMETHASONE SODIUM PHOSPHATE 10 MG: 10 INJECTION, SOLUTION INTRAMUSCULAR; INTRAVENOUS at 09:05

## 2022-09-13 RX ADMIN — Medication 50 MCG: at 09:43

## 2022-09-13 RX ADMIN — Medication 25 MCG: at 11:41

## 2022-09-13 RX ADMIN — CEFAZOLIN 2000 MG: 10 INJECTION, POWDER, FOR SOLUTION INTRAVENOUS at 09:05

## 2022-09-13 ASSESSMENT — PAIN SCALES - GENERAL
PAINLEVEL_OUTOF10: 1
PAINLEVEL_OUTOF10: 2
PAINLEVEL_OUTOF10: 1
PAINLEVEL_OUTOF10: 2
PAINLEVEL_OUTOF10: 1

## 2022-09-13 ASSESSMENT — PAIN - FUNCTIONAL ASSESSMENT: PAIN_FUNCTIONAL_ASSESSMENT: 0-10

## 2022-09-13 NOTE — ANESTHESIA PRE PROCEDURE
Department of Anesthesiology  Preprocedure Note       Name:  Karey Kelsey   Age:  78 y.o.  :  1955                                          MRN:  [id number]         Date:  2022      Surgeon: Daryl Martins):  Thomas Azar MD    Procedure: Procedure(s):  BILATERAL BREAST REDUCTION WITH FREE NIPPLE GRAFT WITH LIPO AXILLARY LATERAL BREAST    Medications prior to admission:   Prior to Admission medications    Medication Sig Start Date End Date Taking?  Authorizing Provider   acetaminophen (TYLENOL) 325 MG tablet Take 650 mg by mouth every 6 hours as needed for Pain   Yes Historical Provider, MD   magnesium oxide (MAG-OX) 400 MG tablet Take 1 tablet by mouth daily 21   Historical Provider, MD   dilTIAZem (CARDIZEM) 30 MG tablet Take 30 mg by mouth 3 times daily 10/18/21   Historical Provider, MD   CALCIUM CITRATE PO Take 630 mg by mouth in the morning, at noon, and at bedtime    Historical Provider, MD   Cholecalciferol (VITAMIN D3 PO) Take 1 tablet by mouth daily    Historical Provider, MD   Cyanocobalamin 1000 MCG SUBL Place 1,000 mcg under the tongue daily    Historical Provider, MD   zoster recombinant adjuvanted vaccine [facility]) 50 MCG/0.5ML SUSR injection Inject 0.5 mLs into the muscle See Admin Instructions 1 dose now and repeat in 2-6 months  Patient not taking: Reported on 2020   Bro Saenz, APRN - CNP   Biotin 5 MG TBDP Take 1 tablet by mouth daily    Historical Provider, MD   Prenatal Vit-Fe Fumarate-FA (PNV PRENATAL PLUS MULTIVITAMIN) 27-1 MG TABS TAKE 1 TABLET BY MOUTH ONE TIME A DAY 17   Historical Provider, MD   valACYclovir (VALTREX) 1 G tablet Take 1,000 mg by mouth daily as needed    Historical Provider, MD       Current medications:    Current Facility-Administered Medications   Medication Dose Route Frequency Provider Last Rate Last Admin    [START ON 2022] lidocaine PF 1 % injection 1 mL  1 mL IntraDERmal Once PRN MD Tejinder Wyman Pacheco ON 9/14/2022] lactated ringers infusion   IntraVENous Continuous Nehemias Alicea MD 50 mL/hr at 09/13/22 0649 New Bag at 09/13/22 0649    sodium chloride flush 0.9 % injection 5-40 mL  5-40 mL IntraVENous 2 times per day Nehemias Alicea MD        sodium chloride flush 0.9 % injection 5-40 mL  5-40 mL IntraVENous PRN Nehemias Alicea MD        0.9 % sodium chloride infusion   IntraVENous PRN Nehemias Alicea MD        ceFAZolin (ANCEF) 2000 mg in dextrose 5 % 50 mL IVPB  2,000 mg IntraVENous Once Suzi Honeycutt MD           Allergies: Allergies   Allergen Reactions    Metoprolol      Allergic dermatitis causing rash    Procardia [Nifedipine] Other (See Comments)     Hand Cyanosis       Problem List:    Patient Active Problem List   Diagnosis Code    Sleep apnea, CPAP G47.30    Vitamin D deficiency E55.9    Abnormal EKG R94.31    Cervical spondylosis M47.812    Chronic left shoulder pain M25.512, G89.29    Chronic pain of right knee M25.561, G89.29    Complete tear of left rotator cuff M75.122    Depression F32. A    Family history of ischemic heart disease and other diseases of the circulatory system Z82.49    History of total bilateral knee replacement Z96.653    Essential hypertension I10    Ingrowing nail L60.0    Mass of left breast N63.20    Mild mitral regurgitation I34.0    Non-compliant behavior R46.89    Normal coronary arteries [id number]    Morbid obesity ([address] E66.01    Perimenopausal disorder N95.9    Plantar fascial fibromatosis M72.2    Tinea unguium B35.1    Cataracta H26.9    S/P gastric surgery (Gastric Sleeve) Z98.890    Recurrent ventral hernia K43.2       Past Medical History:        Diagnosis Date    Acid reflux     Takes TUMS, not taking PPI at this time (8/26/22)    COVID-19 01/2022    Mild per pt., no treatment needed    Depression     Pt. states this was situational during her mother's death, not on meds and feeling better(8/26/22)    Diastasis recti     Hyperlipidemia     Hypertension     Obesity     Osteoarthritis     PONV (postoperative nausea and vomiting)     PVC's (premature ventricular contractions)     Palpitations, had Holter monitor, sees [facility] [employer] [facility] [employer] cardiology (22)    Unspecified sleep apnea     CPAP nightly, sees Dr. Natali Padilla Wears glasses     readers       Past Surgical History:        Procedure Laterality Date    CARDIAC CATHETERIZATION      No stents per pt.  CATARACT REMOVAL Bilateral      SECTION       x 2    CHOLECYSTECTOMY      COLONOSCOPY  2017    ENDOSCOPY, COLON, DIAGNOSTIC      EGD    GASTRIC BYPASS SURGERY  2016    Gastric sleeve per pt.     HEMORROIDECTOMY N/A 2017    EUA COLONOSCOPY  SCOPE W/EXCISION CYST ON BACK  performed by Taylor Auguste DO at [facility] Bilateral     , knees    OTHER SURGICAL HISTORY  2017    excision rt back cyst    TUBAL LIGATION         Social History:    Social History     Tobacco Use    Smoking status: Never    Smokeless tobacco: Never   Substance Use Topics    Alcohol use: Yes     Comment: Very rare, special occasions                                Counseling given: Not Answered      Vital Signs (Current):   Vitals:    22 0625 22 0627   BP: (!) 143/69    Pulse: 71    Resp: 20    Temp:  97.3 °F (36.3 °C)   SpO2: 98%    Weight:  202 lb (91.6 kg)   Height:  4' 11\" (1.499 m)                                              BP Readings from Last 3 Encounters:   22 (!) 143/69   22 (!) 140/66   20 110/80       NPO Status: Time of last liquid consumption:                         Time of last solid consumption:                         Date of last liquid consumption: 22                        Date of last solid food consumption: 22    BMI:   Wt Readings from Last 3 Encounters:   22 202 lb (91.6 kg)   22 205 lb 0.4 oz (93 kg)   20 200 lb (90.7 kg)     Body mass index is 40.8 kg/m². CBC:   Lab Results   Component Value Date/Time    WBC 5.9 08/26/2022 10:22 AM    RBC 5.04 08/26/2022 10:22 AM    RBC <1 07/09/2020 10:34 AM    RBC 4.70 07/09/2020 10:34 AM    HGB 14.4 08/26/2022 10:22 AM    HCT 48.5 08/26/2022 10:22 AM    MCV 96.2 08/26/2022 10:22 AM    RDW 13.9 08/26/2022 10:22 AM     08/26/2022 10:22 AM     05/17/2012 08:03 AM       CMP:   Lab Results   Component Value Date/Time     08/26/2022 10:22 AM    K 3.9 08/26/2022 10:22 AM     08/26/2022 10:22 AM    CO2 29 08/26/2022 10:22 AM    BUN 20 08/26/2022 10:22 AM    CREATININE 0.80 08/26/2022 10:22 AM    GFRAA >60 08/26/2022 10:22 AM    LABGLOM >60 08/26/2022 10:22 AM    GLUCOSE Negative 07/09/2020 10:34 AM    GLUCOSE 89 07/09/2020 10:34 AM    PROT 6.7 07/09/2020 10:34 AM    CALCIUM 9.2 07/09/2020 10:34 AM    BILITOT Negative 07/09/2020 10:34 AM    BILITOT 0.4 07/09/2020 10:34 AM    ALKPHOS 70 07/09/2020 10:34 AM    ALKPHOS 82 05/17/2012 08:03 AM    AST 23 07/09/2020 10:34 AM    ALT 17 07/09/2020 10:34 AM       POC Tests: No results for input(s): POCGLU, POCNA, POCK, POCCL, POCBUN, POCHEMO, POCHCT in the last 72 hours.     Coags: No results found for: PROTIME, INR, APTT    HCG (If Applicable): No results found for: PREGTESTUR, PREGSERUM, HCG, HCGQUANT     ABGs: No results found for: PHART, PO2ART, YKK9PWT, EFR4RLN, BEART, V8OMOKMD     Type & Screen (If Applicable):  No results found for: LABABO, LABRH    Drug/Infectious Status (If Applicable):  Lab Results   Component Value Date/Time    HEPCAB Non-Reactive 07/09/2020 10:34 AM       COVID-19 Screening (If Applicable): No results found for: COVID19        Anesthesia Evaluation    Airway: Mallampati: I  TM distance: >3 FB   Neck ROM: full  Mouth opening: > = 3 FB   Dental:          Pulmonary:   (+) sleep apnea:                             Cardiovascular:    (+) hypertension:,               Stress test reviewed Neuro/Psych:               GI/Hepatic/Renal:             Endo/Other:                     Abdominal:             Vascular:           Other Findings:           Anesthesia Plan      general     ASA 3                                   Adrian Camara MD   9/13/2022

## 2022-09-13 NOTE — DISCHARGE INSTRUCTIONS
Post  Op Discharge Instructions    1. ACTIVITY  _X___ No driving , operating machinery, or making important decisions for 24 hours. Resume normal activity after 24 hours. No heavy lifting until 1st office visit. 2. DIET       _X__   Resume your usual diet unless specified below. ____ Diet Modification:    3. MEDICATIONS     Meds as prescribed  Baby aspirin daily for one month starting tomorrow      4. PHYSICIAN FOLLOW-UP  Appt already scheduled    5. ADDITIONAL INSTRUCTIONS      Empty drains and milk tubing when half full or every 8 hours      Record drainage totals on I/O sheet    If you have additional questions, PLEASE call your doctor                Post  Op Discharge Instructions    1. ACTIVITY  _X___ No driving , operating machinery, or making important decisions for 24 hours. Resume normal activity after 24 hours. No heavy lifting until 1st office visit. 2. DIET       _X__   Resume your usual diet unless specified below. ____ Diet Modification:    3. MEDICATIONS     Meds as prescribed      4. PHYSICIAN FOLLOW-UP  Appt already scheduled    5.  ADDITIONAL INSTRUCTIONS      Empty drains and milk tubing when half full or every 8 hours      Record drainage totals on I/O sheet  No smoking , or being around anyone smoking  Keep warm   No caffeine    If you have additional questions, PLEASE call your doctor

## 2022-09-13 NOTE — H&P
Interval H&P Note    Pt Name: Valdo Nuñez  MRN: [id number]  YOB: 1955  Date of evaluation: 2022      [x] I have reviewed the hardcopy Surgery Note by Dr Sharri Cline dated 22 labeled in short chart for an Interval History and Physical note. [x] I have examined  Valdo Nuñez  There are no changes to the patient who is scheduled for BILATERAL BREAST REDUCTION WITH FREE NIPPLE GRAFT WITH LIPO AXILLARY LATERAL BREAST by Gabbie Villarreal MD for DX COSMETIC. The patient denies new health changes, fever, chills, wheezing, cough, increased SOB, chest pain, open sores or wounds. Cardiac Clearance:  Dr Gutierrez Pen Cardologist [facility] 22 \"Clinically stable from cardiac stand point. Asymptomatic, euvolemic on exam. Functional status meets at least 4 METS. Further cardiac workup planned at this time. Patient can be cleared for breast reconstruction surgery at low to moderate risk. Risk non prohibitive\". Past Medical History:     Past Medical History:   Diagnosis Date    Acid reflux     Takes TUMS, not taking PPI at this time (22)    COVID-19 2022    Mild per pt., no treatment needed    Depression     Pt. states this was situational during her mother's death, not on meds and feeling better(22)    Diastasis recti     Hyperlipidemia     Hypertension     Obesity     Osteoarthritis     PONV (postoperative nausea and vomiting)     PVC's (premature ventricular contractions)     Palpitations, had Holter monitor, sees [facility] [employer] [facility] [employer] cardiology (22)    Unspecified sleep apnea     CPAP nightly, sees Dr. Bolling Osgood    Wears glasses     readers        Past Surgical History:     Past Surgical History:   Procedure Laterality Date    CARDIAC CATHETERIZATION      No stents per pt.     CATARACT REMOVAL Bilateral      SECTION       x 2    CHOLECYSTECTOMY      COLONOSCOPY  2017    ENDOSCOPY, COLON, DIAGNOSTIC      EGD    GASTRIC BYPASS SURGERY  2016    Gastric sleeve per pt. HEMORROIDECTOMY N/A 05/24/2017    EUA COLONOSCOPY  SCOPE W/EXCISION CYST ON BACK  performed by Viviane Rust DO at [address] Bilateral     2008,2010 knees    OTHER SURGICAL HISTORY  05/24/2017    excision rt back cyst    TUBAL LIGATION          Social History:     Tobacco:    reports that she has never smoked. She has never used smokeless tobacco.  Alcohol:      reports current alcohol use. Drug Use:  reports no history of drug use. Family History:     Family History   Problem Relation Age of Onset    Heart Disease Mother     High Blood Pressure Father     Arthritis Father     High Cholesterol Father     Heart Disease Maternal Grandmother     Heart Disease Maternal Grandfather     Heart Disease Paternal Grandmother     Heart Disease Paternal Grandfather     Stroke Paternal Grandfather         Vital signs: BP (!) 143/69   Pulse 71   Temp 97.3 °F (36.3 °C)   Resp 20   Ht 4' 11\" (1.499 m)   Wt 202 lb (91.6 kg)   SpO2 98%   BMI 40.80 kg/m²     Allergies:  Metoprolol and Procardia [nifedipine]    Medications:    Prior to Admission medications    Medication Sig Start Date End Date Taking?  Authorizing Provider   acetaminophen (TYLENOL) 325 MG tablet Take 650 mg by mouth every 6 hours as needed for Pain   Yes Historical Provider, MD   magnesium oxide (MAG-OX) 400 MG tablet Take 1 tablet by mouth daily 12/7/21   Historical Provider, MD   dilTIAZem (CARDIZEM) 30 MG tablet Take 30 mg by mouth 3 times daily 10/18/21   Historical Provider, MD   CALCIUM CITRATE PO Take 630 mg by mouth in the morning, at noon, and at bedtime    Historical Provider, MD   Cholecalciferol (VITAMIN D3 PO) Take 1 tablet by mouth daily    Historical Provider, MD   Cyanocobalamin 1000 MCG SUBL Place 1,000 mcg under the tongue daily    Historical Provider, MD   zoster recombinant adjuvanted vaccine (SHINGRIX) 50 MCG/0.5ML SUSR injection Inject 0.5 mLs into the muscle See Admin Instructions 1 dose now and repeat in 2-6 months  Patient not taking: Reported on 9/11/2020 7/8/20   BHARTI Almazan CNP   Biotin 5 MG TBDP Take 1 tablet by mouth daily    Historical Provider, MD   Prenatal Vit-Fe Fumarate-FA (PNV PRENATAL PLUS MULTIVITAMIN) 27-1 MG TABS TAKE 1 TABLET BY MOUTH ONE TIME A DAY 4/6/17   Historical Provider, MD   valACYclovir (VALTREX) 1 G tablet Take 1,000 mg by mouth daily as needed    Historical Provider, MD         This is a 78 y.o.morbidly obese female who is pleasant, cooperative, alert and oriented x3, in no acute distress. Heart: Heart sounds are normal.  HR 71 regular rate and rhythm without murmur, gallop or rub. Lungs: Normal respiratory effort with equal expansion, good air exchange, unlabored and clear to auscultation without wheezes or rales bilaterally   Abdomen: soft, obese with fold, nontender, nondistended with bowel sounds . Labs:  Recent Labs     08/26/22  1022   HGB 14.4   HCT 48.5*   WBC 5.9   MCV 96.2         K 3.9      CO2 29   BUN 20   CREATININE 0.80       No results for input(s): COVID19 in the last 720 hours.     BHARTI Avina CNP  Electronically signed 9/13/2022 at 7:04 AM

## 2022-09-13 NOTE — ANESTHESIA POSTPROCEDURE EVALUATION
Department of Anesthesiology  Postprocedure Note    Patient: Shanell Fuller  MRN: [id number]  YOB: 1955  Date of evaluation: 9/13/2022      Procedure Summary     Date: 09/13/22 Room / Location: [address] - INPATIENT    Anesthesia Start: 8161 Anesthesia Stop: 5052    Procedure: BILATERAL BREAST REDUCTION WITH FREE NIPPLE GRAFT WITH LIPO AXILLARY LATERAL BREAST (Bilateral: Breast) Diagnosis:       Encounter for cosmetic surgery      (DX COSMETIC)    Surgeons: Dustin Cueto MD Responsible Provider: Nato Dial MD    Anesthesia Type: general ASA Status: 3          Anesthesia Type: No value filed.     Romel Phase I:      Romel Phase II:        Anesthesia Post Evaluation    Complications: no

## 2022-09-14 LAB — SURGICAL PATHOLOGY REPORT: NORMAL

## 2022-09-14 NOTE — OP NOTE
[id number] [facility] [address]                [address] [place] [address]                                OPERATIVE REPORT    PATIENT NAME: Rosendo Stack                    :        1955  MED REC NO:   [id number]                             ROOM:  ACCOUNT NO:   [de-identified]                           ADMIT DATE: 2022  PROVIDER:     Lakisha Mayer    DATE OF PROCEDURE:  2022    LOCATION:  [address] [place] [address]. SERVICE:  Plastic Surgery. PREOPERATIVE DIAGNOSES:  1. Symptomatic macromastia with severe breast ptosis. 2.  Localized fatty deposits, lateral breast and axilla. 3.  History of extreme weight loss. POSTOPERATIVE DIAGNOSES:  1. Symptomatic macromastia with severe breast ptosis. 2.  Localized fatty deposits, lateral breast and axilla. 3.  History of extreme weight loss. PROCEDURES:  1.  Bilateral breast reduction with free nipple grafts. 2.  Suction-assisted lipectomy, lateral breast and axilla. SURGEON:  Lakisha Mayer MD    FIRST ASSISTANT:  ERNESTINE Crystal    ANESTHESIA:  General.    INDICATIONS:  This is a 81-year-old female who presents with severe  macromastia and extreme breast ptosis. She has had multiple problems  with her upper back, neck, shoulders, and frequent intertriginous rash. Because of the amount of ptosis and nearly all the breast tissue being  well below the inframammary fold, the patient understood that free  nipple grafts would be recommended. We also discussed the fact that the  majority of the breast tissue could not be retained due to the amount of  descent off the chest wall and poor circulation and no guarantee could  be made regarding final volume and contour. We fully reviewed all  potential risks, limitations, and complications of the above including  expected location and extent of the access incisions and recovery time.     OPERATIVE PROCEDURE:  The patient was seen in the preoperative area to  again review plans for the procedure. In an upright position, the new  nipple position was marked approximately 12 cm from the upper breast  fold and then the patient was placed horizontally to agustina inframammary  fold in the lateral and medial extensions. The patient was taken to the  operating room and given general intubated anesthesia. She received 2  gm of cefazolin and 10 mg of Decadron IV preoperatively. The chest was  prepped and draped. The tumescent solution was 1 liter of Ringer's with  2 gm of tranexamic acid, 12 mL of 2% lidocaine, 1 mL of epinephrine. This was infiltrated into the areas for suctioning and the incisions,  but not near the nipple pedicle. The exploded-tip cannula was used to  harvest fat removing approximately 650 mL per side and a careful check  was made for symmetry. All the incision points were scored. A  full-thickness harvest of each nipple-areola complex was done and tissue  was saved in a bloody sponge. We made an effort to retain as much  breast tissue as possible in the central core because of extreme ptosis. Medial and lateral access was resected leaving the upper circulation to  the medial and lateral pillars and central mound intact. The medial and  lateral corner points were then secured to the inframammary fold with a  0 Vicryl and Tailor tacking was done to close the skin excess medially  and laterally. The harvested nipple areolar skin was then sutured into  the dermal pedicle with 5-0 chromic interrupted and running 5-0 fast  absorbing. 0 Vicryl and 2-0 PDS were used to close the deeper fascia  taking tension off the skin edges and then interrupted 3-0 PDS and an  intradermal INSORB staple was used to close the dermis. The skin was  closed with a running quilt suture completing the procedure. A tie-over  dressing with Xeroform, antibiotic ointment for the nipple grafts  completed the procedure. Blood loss was estimated to be 40 mL. Total  amount of resected tissue was 1000 gm on the left and 1100 gm on the  right. The amount of aspirate was approximately 650 per side. Sponge  and needle counts were reported to be correct. The patient was placed  in a postoperative bra with dressings and taken to the recovery room in  stable condition.         Maddie Manley    D: 09/13/2022 14:25:38       T: 09/14/2022 0:45:05     [id number]  Job#: [id number]     Doc#: [id number]    CC:

## (undated) DEVICE — TUBE ET DIA7.5MM ORAL NSL CUF MURPHY EYE HI LO RADPQ LN

## (undated) DEVICE — SUTURE CHROMIC GUT SZ 2-0 L54IN ABSRB BRN VIRTUAL LIGAPAK L113G

## (undated) DEVICE — BLUNTFILL: Brand: MONOJECT

## (undated) DEVICE — 1810 FOAM BLOCK NEEDLE COUNTER: Brand: DEVON

## (undated) DEVICE — INTENDED FOR TISSUE SEPARATION, AND OTHER PROCEDURES THAT REQUIRE A SHARP SURGICAL BLADE TO PUNCTURE OR CUT.: Brand: BARD-PARKER ® CARBON RIB-BACK BLADES

## (undated) DEVICE — GAUZE,SPONGE,FLUFF,6"X6.75",STRL,5/TRAY: Brand: MEDLINE

## (undated) DEVICE — DRAPE,MEDI-SLUSH,STERILE: Brand: MEDLINE

## (undated) DEVICE — TUBING SET SUCTION LIPO SYS PVC

## (undated) DEVICE — NO USE 18 MONTHS GOWN STD LG  1153D

## (undated) DEVICE — DRAIN SURG 19FR 100% SIL RADPQ RND CHN FULL FLUT

## (undated) DEVICE — STERILE LATEX POWDER-FREE SURGICAL GLOVESWITH NITRILE COATING: Brand: PROTEXIS

## (undated) DEVICE — GLOVE SURG SZ 8 CRM LTX FREE POLYISOPRENE POLYMER BEAD ANTI

## (undated) DEVICE — MATERNITY KNIT PANTS,SEAMLESS: Brand: WINGS

## (undated) DEVICE — DRAIN,WOUND,ROUND,24FR,5/16",FULL-FLUTED: Brand: MEDLINE

## (undated) DEVICE — DRESSING,GAUZE,XEROFORM,CURAD,5"X9",ST: Brand: CURAD

## (undated) DEVICE — BRA COMPR 3XL NYL LYCRA SPANDEX CURAD

## (undated) DEVICE — SPONGE ABSORBABLE TOP 100 GEL GELFOAM LF

## (undated) DEVICE — TUBING SET INFLTR LIPO SYS PVC

## (undated) DEVICE — SUTURE STRATAFIX SPRL MCRYL + 3 0 SGL ARMED PS 1 24 IN LEN SXMP1B103

## (undated) DEVICE — SOLUTION IV 1000ML 0.9% SOD CHL PH 5 INJ USP VIAFLX PLAS

## (undated) DEVICE — PLASMABLADE PS210-030S 3.0S LOCK: Brand: PLASMABLADE™

## (undated) DEVICE — SUTURE VCRL + SZ 4-0 L18IN ABSRB UD L19MM PS-2 3/8 CIR PRIM VCP496H

## (undated) DEVICE — SYRINGE MED 30ML STD CLR PLAS LUERLOCK TIP N CTRL DISP

## (undated) DEVICE — SUTURE ETHLN SZ 2-0 L18IN NONABSORBABLE BLK L26MM FS 3/8 664G

## (undated) DEVICE — DRAPE,REIN 53X77,STERILE: Brand: MEDLINE

## (undated) DEVICE — SUTURE MCRYL SZ 3-0 L27IN ABSRB UD L24MM PS-1 3/8 CIR PRIM Y936H

## (undated) DEVICE — SOLUTION IV 1000ML LAC RINGERS PH 6.5 INJ USP VIAFLX PLAS

## (undated) DEVICE — SUTURE VCRL SZ 0 L36IN ABSRB UD L36MM CT-1 1/2 CIR J946H

## (undated) DEVICE — MEDI-VAC YANKAUER SUCTION HANDLE W/BULBOUS TIP: Brand: CARDINAL HEALTH

## (undated) DEVICE — SURGICAL PROCEDURE PACK PERI-GYN 7177C] SURGICAL EXPRESS]

## (undated) DEVICE — PEN: MARKING STD 100/CS: Brand: MEDICAL ACTION INDUSTRIES

## (undated) DEVICE — COVER LT HNDL BLU PLAS

## (undated) DEVICE — NEEDLE SPINAL 22GA L3.5IN SPINOCAN

## (undated) DEVICE — SUTURE PDS II SZ 3-0 L27IN ABSRB CLR SH L26MM 1/2 CIR TAPR Z416H

## (undated) DEVICE — GLOVE SURG SZ 65 CRM LTX FREE POLYISOPRENE POLYMER BEAD ANTI

## (undated) DEVICE — SHEARS ENDOSCP L9CM CRV HARM FOCS +

## (undated) DEVICE — SUTURE VCRL + SZ 3-0 L27IN ABSRB UD L26MM SH 1/2 CIR VCP416H

## (undated) DEVICE — SPONGE LAP W18XL18IN WHT COT 4 PLY FLD STRUNG RADPQ DISP ST

## (undated) DEVICE — BLADE ES ELASTOMERIC COAT INSUL DURABLE BEND UPTO 90DEG

## (undated) DEVICE — PAD,ABDOMINAL,8"X7.5",ST,LF,20/BX: Brand: MEDLINE INDUSTRIES, INC.

## (undated) DEVICE — DISCONTINUED USE 394504 SYRINGE LUER LOCK TIP 12 ML

## (undated) DEVICE — RESERVOIR,SUCTION,100CC,SILICONE: Brand: MEDLINE

## (undated) DEVICE — GOWN,SIRUS,NON REINFRCD,LARGE,SET IN SL: Brand: MEDLINE

## (undated) DEVICE — SKIN PREP TRAY W/CHG: Brand: MEDLINE INDUSTRIES, INC.

## (undated) DEVICE — 1 ML TUBERCULIN SYRINGE LUER-LOCK TIP: Brand: MONOJECT

## (undated) DEVICE — STAPLER SKIN SQ 30 ABSRB STPL DISP INSORB

## (undated) DEVICE — STERILE COTTON BALLS LARGE 5/P: Brand: MEDLINE

## (undated) DEVICE — SHORTSHOT SAEED HEMORRHOIDAL MULTI-BAND LIGATOR WITH TRIVIEW ANOSCOPE: Brand: SHORTSHOT

## (undated) DEVICE — SUTURE PDS II SZ 2-0 L27IN ABSRB VLT L36MM CT-1 1/2 CIR Z339H

## (undated) DEVICE — SUTURE ETHLN SZ 5-0 L18IN NONABSORBABLE BLK L16MM PC-3 3/8 1865G

## (undated) DEVICE — PAD,ABDOMINAL,5"X9",ST,LF,25/BX: Brand: MEDLINE INDUSTRIES, INC.

## (undated) DEVICE — DUP USE 128098 CLEANSER EXIDINE CHG 4OZ

## (undated) DEVICE — Device

## (undated) DEVICE — TOWEL,OR,DSP,ST,BLUE,DLX,XR,4/PK,20PK/CS: Brand: MEDLINE

## (undated) DEVICE — SUTURE VCRL + SZ 4-0 L27IN ABSRB UD L26MM SH 1/2 CIR VCP415H

## (undated) DEVICE — SYRINGE, LUER LOCK, 10ML: Brand: MEDLINE

## (undated) DEVICE — PREP-RESISTANT MARKER W/ RULER: Brand: MEDLINE INDUSTRIES, INC.

## (undated) DEVICE — 1200CC GUARDIAN II: Brand: GUARDIAN

## (undated) DEVICE — SOLUTION IV IRRIG WATER 1000ML POUR BRL 2F7114

## (undated) DEVICE — NEEDLE HYPO 25GA L1.5IN BLU POLYPR HUB S STL REG BVL STR

## (undated) DEVICE — FORCEPS BX L240CM WRK CHN 2.8MM STD CAP W/ NDL MIC MESH

## (undated) DEVICE — SURGICAL PROCEDURE KIT BASIN MAJ SET UP

## (undated) DEVICE — SOLUTION IV IRRIG POUR BRL 0.9% SODIUM CHL 2F7124